# Patient Record
Sex: MALE | Race: WHITE | NOT HISPANIC OR LATINO | Employment: UNEMPLOYED | URBAN - METROPOLITAN AREA
[De-identification: names, ages, dates, MRNs, and addresses within clinical notes are randomized per-mention and may not be internally consistent; named-entity substitution may affect disease eponyms.]

---

## 2024-02-21 ENCOUNTER — HOSPITAL ENCOUNTER (OUTPATIENT)
Dept: RADIOLOGY | Facility: HOSPITAL | Age: 34
Discharge: HOME | End: 2024-02-21

## 2024-02-21 ENCOUNTER — APPOINTMENT (OUTPATIENT)
Dept: RADIOLOGY | Facility: CLINIC | Age: 34
End: 2024-02-21

## 2024-02-21 DIAGNOSIS — L89.610 UNSTAGEABLE PRESSURE ULCER OF RIGHT HEEL (HCC): ICD-10-CM

## 2024-02-21 PROCEDURE — 73650 X-RAY EXAM OF HEEL: CPT

## 2024-02-22 DIAGNOSIS — M86.00 ACUTE HEMATOGENOUS OSTEOMYELITIS, UNSPECIFIED SITE (HCC): Primary | ICD-10-CM

## 2024-02-23 ENCOUNTER — APPOINTMENT (EMERGENCY)
Dept: CT IMAGING | Facility: HOSPITAL | Age: 34
DRG: 720 | End: 2024-02-23
Payer: COMMERCIAL

## 2024-02-23 ENCOUNTER — APPOINTMENT (EMERGENCY)
Dept: RADIOLOGY | Facility: HOSPITAL | Age: 34
DRG: 720 | End: 2024-02-23
Payer: COMMERCIAL

## 2024-02-23 ENCOUNTER — HOSPITAL ENCOUNTER (INPATIENT)
Facility: HOSPITAL | Age: 34
LOS: 8 days | Discharge: HOME | DRG: 720 | End: 2024-03-02
Attending: EMERGENCY MEDICINE | Admitting: STUDENT IN AN ORGANIZED HEALTH CARE EDUCATION/TRAINING PROGRAM
Payer: COMMERCIAL

## 2024-02-23 ENCOUNTER — APPOINTMENT (INPATIENT)
Dept: MRI IMAGING | Facility: HOSPITAL | Age: 34
DRG: 720 | End: 2024-02-23
Payer: COMMERCIAL

## 2024-02-23 DIAGNOSIS — A41.9 SEPSIS WITHOUT ACUTE ORGAN DYSFUNCTION, DUE TO UNSPECIFIED ORGANISM (HCC): ICD-10-CM

## 2024-02-23 DIAGNOSIS — M79.671 RIGHT FOOT PAIN: Primary | ICD-10-CM

## 2024-02-23 DIAGNOSIS — M86.9 OSTEOMYELITIS OF RIGHT FOOT (HCC): ICD-10-CM

## 2024-02-23 DIAGNOSIS — F11.93 OPIATE WITHDRAWAL (HCC): ICD-10-CM

## 2024-02-23 PROBLEM — S92.009A CALCANEAL FRACTURE: Status: ACTIVE | Noted: 2024-02-23

## 2024-02-23 PROBLEM — F11.90 OPIOID USE DISORDER: Status: ACTIVE | Noted: 2024-02-23

## 2024-02-23 PROBLEM — S92.001A FRACTURE OF RIGHT CALCANEUS: Status: ACTIVE | Noted: 2024-02-23

## 2024-02-23 PROBLEM — R82.90 ABNORMAL URINALYSIS: Status: ACTIVE | Noted: 2024-02-23

## 2024-02-23 LAB
ALBUMIN SERPL BCP-MCNC: 4.8 G/DL (ref 3.5–5)
ALP SERPL-CCNC: 117 U/L (ref 34–104)
ALT SERPL W P-5'-P-CCNC: 26 U/L (ref 7–52)
AMORPH URATE CRY URNS QL MICRO: ABNORMAL
AMPHETAMINES SERPL QL SCN: NEGATIVE
ANION GAP SERPL CALCULATED.3IONS-SCNC: 12 MMOL/L
AST SERPL W P-5'-P-CCNC: 14 U/L (ref 13–39)
BACTERIA UR QL AUTO: ABNORMAL /HPF
BARBITURATES UR QL: NEGATIVE
BASOPHILS # BLD AUTO: 0.04 THOUSANDS/ÂΜL (ref 0–0.1)
BASOPHILS NFR BLD AUTO: 0 % (ref 0–1)
BENZODIAZ UR QL: POSITIVE
BILIRUB SERPL-MCNC: 0.48 MG/DL (ref 0.2–1)
BILIRUB UR QL STRIP: NEGATIVE
BUN SERPL-MCNC: 15 MG/DL (ref 5–25)
CALCIUM SERPL-MCNC: 10.4 MG/DL (ref 8.4–10.2)
CHLORIDE SERPL-SCNC: 102 MMOL/L (ref 96–108)
CLARITY UR: CLEAR
CO2 SERPL-SCNC: 27 MMOL/L (ref 21–32)
COCAINE UR QL: NEGATIVE
COLOR UR: YELLOW
CREAT SERPL-MCNC: 0.81 MG/DL (ref 0.6–1.3)
CRP SERPL QL: 10.9 MG/L
EOSINOPHIL # BLD AUTO: 0.01 THOUSAND/ÂΜL (ref 0–0.61)
EOSINOPHIL NFR BLD AUTO: 0 % (ref 0–6)
ERYTHROCYTE [DISTWIDTH] IN BLOOD BY AUTOMATED COUNT: 12.9 % (ref 11.6–15.1)
ERYTHROCYTE [SEDIMENTATION RATE] IN BLOOD: 69 MM/HOUR (ref 0–14)
GFR SERPL CREATININE-BSD FRML MDRD: 118 ML/MIN/1.73SQ M
GLUCOSE SERPL-MCNC: 114 MG/DL (ref 65–140)
GLUCOSE UR STRIP-MCNC: ABNORMAL MG/DL
HCT VFR BLD AUTO: 46.7 % (ref 36.5–49.3)
HGB BLD-MCNC: 14.9 G/DL (ref 12–17)
HGB UR QL STRIP.AUTO: NEGATIVE
IMM GRANULOCYTES # BLD AUTO: 0.19 THOUSAND/UL (ref 0–0.2)
IMM GRANULOCYTES NFR BLD AUTO: 1 % (ref 0–2)
KETONES UR STRIP-MCNC: ABNORMAL MG/DL
LACTATE SERPL-SCNC: 0.9 MMOL/L (ref 0.5–2)
LACTATE SERPL-SCNC: 2.1 MMOL/L (ref 0.5–2)
LEUKOCYTE ESTERASE UR QL STRIP: NEGATIVE
LYMPHOCYTES # BLD AUTO: 2.38 THOUSANDS/ÂΜL (ref 0.6–4.47)
LYMPHOCYTES NFR BLD AUTO: 14 % (ref 14–44)
MCH RBC QN AUTO: 27.9 PG (ref 26.8–34.3)
MCHC RBC AUTO-ENTMCNC: 31.9 G/DL (ref 31.4–37.4)
MCV RBC AUTO: 88 FL (ref 82–98)
METHADONE UR QL: NEGATIVE
MONOCYTES # BLD AUTO: 1.4 THOUSAND/ÂΜL (ref 0.17–1.22)
MONOCYTES NFR BLD AUTO: 8 % (ref 4–12)
NEUTROPHILS # BLD AUTO: 13.56 THOUSANDS/ÂΜL (ref 1.85–7.62)
NEUTS SEG NFR BLD AUTO: 77 % (ref 43–75)
NITRITE UR QL STRIP: NEGATIVE
NON-SQ EPI CELLS URNS QL MICRO: ABNORMAL /HPF
NRBC BLD AUTO-RTO: 0 /100 WBCS
OPIATES UR QL SCN: NEGATIVE
OXYCODONE+OXYMORPHONE UR QL SCN: NEGATIVE
PCP UR QL: NEGATIVE
PH UR STRIP.AUTO: 6 [PH]
PLATELET # BLD AUTO: 593 THOUSANDS/UL (ref 149–390)
PMV BLD AUTO: 9.3 FL (ref 8.9–12.7)
POTASSIUM SERPL-SCNC: 3.6 MMOL/L (ref 3.5–5.3)
PROT SERPL-MCNC: 9.1 G/DL (ref 6.4–8.4)
PROT UR STRIP-MCNC: ABNORMAL MG/DL
RBC # BLD AUTO: 5.34 MILLION/UL (ref 3.88–5.62)
RBC #/AREA URNS AUTO: ABNORMAL /HPF
SODIUM SERPL-SCNC: 141 MMOL/L (ref 135–147)
SP GR UR STRIP.AUTO: >=1.03 (ref 1–1.03)
THC UR QL: NEGATIVE
UROBILINOGEN UR STRIP-ACNC: <2 MG/DL
WBC # BLD AUTO: 17.58 THOUSAND/UL (ref 4.31–10.16)
WBC #/AREA URNS AUTO: ABNORMAL /HPF

## 2024-02-23 PROCEDURE — 99285 EMERGENCY DEPT VISIT HI MDM: CPT

## 2024-02-23 PROCEDURE — 99448 NTRPROF PH1/NTRNET/EHR 21-30: CPT | Performed by: EMERGENCY MEDICINE

## 2024-02-23 PROCEDURE — 96374 THER/PROPH/DIAG INJ IV PUSH: CPT

## 2024-02-23 PROCEDURE — 99254 IP/OBS CNSLTJ NEW/EST MOD 60: CPT | Performed by: PODIATRIST

## 2024-02-23 PROCEDURE — 85652 RBC SED RATE AUTOMATED: CPT | Performed by: PHYSICIAN ASSISTANT

## 2024-02-23 PROCEDURE — A9585 GADOBUTROL INJECTION: HCPCS | Performed by: STUDENT IN AN ORGANIZED HEALTH CARE EDUCATION/TRAINING PROGRAM

## 2024-02-23 PROCEDURE — 83605 ASSAY OF LACTIC ACID: CPT | Performed by: PHYSICIAN ASSISTANT

## 2024-02-23 PROCEDURE — 87086 URINE CULTURE/COLONY COUNT: CPT | Performed by: STUDENT IN AN ORGANIZED HEALTH CARE EDUCATION/TRAINING PROGRAM

## 2024-02-23 PROCEDURE — 73723 MRI JOINT LWR EXTR W/O&W/DYE: CPT

## 2024-02-23 PROCEDURE — 99285 EMERGENCY DEPT VISIT HI MDM: CPT | Performed by: PHYSICIAN ASSISTANT

## 2024-02-23 PROCEDURE — 81001 URINALYSIS AUTO W/SCOPE: CPT | Performed by: PHYSICIAN ASSISTANT

## 2024-02-23 PROCEDURE — 99223 1ST HOSP IP/OBS HIGH 75: CPT | Performed by: STUDENT IN AN ORGANIZED HEALTH CARE EDUCATION/TRAINING PROGRAM

## 2024-02-23 PROCEDURE — 80053 COMPREHEN METABOLIC PANEL: CPT | Performed by: PHYSICIAN ASSISTANT

## 2024-02-23 PROCEDURE — 87040 BLOOD CULTURE FOR BACTERIA: CPT | Performed by: PHYSICIAN ASSISTANT

## 2024-02-23 PROCEDURE — 71045 X-RAY EXAM CHEST 1 VIEW: CPT

## 2024-02-23 PROCEDURE — 83605 ASSAY OF LACTIC ACID: CPT | Performed by: STUDENT IN AN ORGANIZED HEALTH CARE EDUCATION/TRAINING PROGRAM

## 2024-02-23 PROCEDURE — 73700 CT LOWER EXTREMITY W/O DYE: CPT

## 2024-02-23 PROCEDURE — 85025 COMPLETE CBC W/AUTO DIFF WBC: CPT | Performed by: PHYSICIAN ASSISTANT

## 2024-02-23 PROCEDURE — 86140 C-REACTIVE PROTEIN: CPT | Performed by: PHYSICIAN ASSISTANT

## 2024-02-23 PROCEDURE — 96361 HYDRATE IV INFUSION ADD-ON: CPT

## 2024-02-23 PROCEDURE — 80307 DRUG TEST PRSMV CHEM ANLYZR: CPT | Performed by: STUDENT IN AN ORGANIZED HEALTH CARE EDUCATION/TRAINING PROGRAM

## 2024-02-23 PROCEDURE — 36415 COLL VENOUS BLD VENIPUNCTURE: CPT | Performed by: PHYSICIAN ASSISTANT

## 2024-02-23 RX ORDER — ONDANSETRON 2 MG/ML
4 INJECTION INTRAMUSCULAR; INTRAVENOUS EVERY 4 HOURS PRN
Status: DISCONTINUED | OUTPATIENT
Start: 2024-02-23 | End: 2024-02-25

## 2024-02-23 RX ORDER — ONDANSETRON 2 MG/ML
4 INJECTION INTRAMUSCULAR; INTRAVENOUS ONCE
Status: COMPLETED | OUTPATIENT
Start: 2024-02-23 | End: 2024-02-23

## 2024-02-23 RX ORDER — CEFEPIME HYDROCHLORIDE 2 G/50ML
2000 INJECTION, SOLUTION INTRAVENOUS EVERY 12 HOURS
Status: DISCONTINUED | OUTPATIENT
Start: 2024-02-24 | End: 2024-02-24

## 2024-02-23 RX ORDER — CEFEPIME HYDROCHLORIDE 2 G/50ML
2000 INJECTION, SOLUTION INTRAVENOUS ONCE
Status: COMPLETED | OUTPATIENT
Start: 2024-02-23 | End: 2024-02-23

## 2024-02-23 RX ORDER — POLYETHYLENE GLYCOL 3350 17 G/17G
17 POWDER, FOR SOLUTION ORAL DAILY PRN
Status: DISCONTINUED | OUTPATIENT
Start: 2024-02-23 | End: 2024-03-02 | Stop reason: HOSPADM

## 2024-02-23 RX ORDER — BUPRENORPHINE AND NALOXONE 8; 2 MG/1; MG/1
8 FILM, SOLUBLE BUCCAL; SUBLINGUAL 2 TIMES DAILY
Status: DISCONTINUED | OUTPATIENT
Start: 2024-02-23 | End: 2024-03-02 | Stop reason: HOSPADM

## 2024-02-23 RX ORDER — SODIUM CHLORIDE, SODIUM GLUCONATE, SODIUM ACETATE, POTASSIUM CHLORIDE, MAGNESIUM CHLORIDE, SODIUM PHOSPHATE, DIBASIC, AND POTASSIUM PHOSPHATE .53; .5; .37; .037; .03; .012; .00082 G/100ML; G/100ML; G/100ML; G/100ML; G/100ML; G/100ML; G/100ML
75 INJECTION, SOLUTION INTRAVENOUS CONTINUOUS
Status: DISCONTINUED | OUTPATIENT
Start: 2024-02-23 | End: 2024-02-26

## 2024-02-23 RX ORDER — GADOBUTROL 604.72 MG/ML
8 INJECTION INTRAVENOUS
Status: COMPLETED | OUTPATIENT
Start: 2024-02-23 | End: 2024-02-23

## 2024-02-23 RX ORDER — KETOROLAC TROMETHAMINE 30 MG/ML
15 INJECTION, SOLUTION INTRAMUSCULAR; INTRAVENOUS EVERY 6 HOURS PRN
Status: DISPENSED | OUTPATIENT
Start: 2024-02-23 | End: 2024-02-27

## 2024-02-23 RX ORDER — MAGNESIUM HYDROXIDE/ALUMINUM HYDROXICE/SIMETHICONE 120; 1200; 1200 MG/30ML; MG/30ML; MG/30ML
30 SUSPENSION ORAL EVERY 6 HOURS PRN
Status: DISCONTINUED | OUTPATIENT
Start: 2024-02-23 | End: 2024-03-02 | Stop reason: HOSPADM

## 2024-02-23 RX ORDER — ACETAMINOPHEN 325 MG/1
650 TABLET ORAL EVERY 6 HOURS PRN
Status: DISCONTINUED | OUTPATIENT
Start: 2024-02-23 | End: 2024-02-24

## 2024-02-23 RX ADMIN — ACETAMINOPHEN 650 MG: 325 TABLET ORAL at 20:07

## 2024-02-23 RX ADMIN — ONDANSETRON 4 MG: 2 INJECTION INTRAMUSCULAR; INTRAVENOUS at 14:18

## 2024-02-23 RX ADMIN — GADOBUTROL 8 ML: 604.72 INJECTION INTRAVENOUS at 22:50

## 2024-02-23 RX ADMIN — VANCOMYCIN HYDROCHLORIDE 2000 MG: 5 INJECTION, POWDER, LYOPHILIZED, FOR SOLUTION INTRAVENOUS at 20:14

## 2024-02-23 RX ADMIN — SODIUM CHLORIDE 1000 ML: 0.9 INJECTION, SOLUTION INTRAVENOUS at 14:35

## 2024-02-23 RX ADMIN — BUPRENORPHINE AND NALOXONE 8 MG: 8; 2 FILM BUCCAL; SUBLINGUAL at 18:18

## 2024-02-23 RX ADMIN — CEFEPIME HYDROCHLORIDE 2000 MG: 2 INJECTION, SOLUTION INTRAVENOUS at 16:25

## 2024-02-23 NOTE — ED PROVIDER NOTES
"History  Chief Complaint   Patient presents with    Wound Check     Pt reports fracturing his right heel and then got an infection in the bone. Started oral abx today. Came to er to \"get it checked out\". Reports he did not get it checked before being placed on abx, and the abx came from [facility] rehab.      Eh is a 32 y/o M with PMH of HTN and opioid abuse currently detoxing presenting to the ER with the c/o right heel osteomyelitis. Pt fractured the heel 1/2/24 after jumping over a fence and is currently in a walking boot. He went to [email or web address] yesterday for increased redness and pain in the right foot. He also reports he had complete loss of sensation in the foot. It was xrayed and showed calceneus suspicious for osteomyelitis. He was instructed to get an MRI yesterday, but did not get it. He currently states the pain is a 5/10 and he was able to walk in the ER today, however this morning it was extremely painful to walk on it. He also had severe chills this morning. He just got to [facility] 3 days ago so is 3 days into detox. Also c/o tremors, SOB, N/V (last episode of emesis last night), increased erythema and swelling of the right foot.       History provided by:  Patient  Wound Check         None       Past Medical History:   Diagnosis Date    Hypertension        History reviewed. No pertinent surgical history.    History reviewed. No pertinent family history.  I have reviewed and agree with the history as documented.    E-Cigarette/Vaping     E-Cigarette/Vaping Substances     Social History     Tobacco Use    Smoking status: Never    Smokeless tobacco: Never   Substance Use Topics    Alcohol use: Never    Drug use: Not Currently     Types: Fentanyl, Heroin       Review of Systems   Constitutional:  Positive for chills. Negative for fever.   HENT:  Negative for ear pain and sore throat.    Eyes:  Negative for pain and visual disturbance.   Respiratory:  Positive for shortness of breath. Negative " for cough.    Cardiovascular:  Negative for chest pain and palpitations.   Gastrointestinal:  Positive for nausea and vomiting. Negative for abdominal pain.   Genitourinary:  Negative for dysuria and hematuria.   Musculoskeletal:  Positive for arthralgias (right foot) and joint swelling. Negative for back pain.   Skin:  Negative for color change and rash.   Neurological:  Positive for tremors. Negative for seizures, syncope and headaches.   All other systems reviewed and are negative.      Physical Exam  Physical Exam  Vitals and nursing note reviewed.   Constitutional:       General: He is not in acute distress.     Appearance: He is well-developed. He is not ill-appearing.   HENT:      Head: Normocephalic and atraumatic.      Jaw: There is normal jaw occlusion.      Nose: Nose normal.      Mouth/Throat:      Mouth: Mucous membranes are moist.   Eyes:      Conjunctiva/sclera: Conjunctivae normal.   Cardiovascular:      Rate and Rhythm: Normal rate and regular rhythm.      Pulses:           Radial pulses are 2+ on the right side and 2+ on the left side.        Dorsalis pedis pulses are 2+ on the right side and 2+ on the left side.      Heart sounds: No murmur heard.  Pulmonary:      Effort: Pulmonary effort is normal. No respiratory distress.      Breath sounds: Normal breath sounds and air entry.   Abdominal:      General: Abdomen is flat. Bowel sounds are normal.      Palpations: Abdomen is soft.      Tenderness: There is no abdominal tenderness.   Musculoskeletal:         General: No swelling.      Cervical back: Neck supple.      Right foot: Normal range of motion. Swelling and tenderness present.      Left foot: Normal.      Comments: Increased erythema and edema of R foot when compared to L foot. No tenderness to palpation. No open wounds   Skin:     General: Skin is warm and dry.      Capillary Refill: Capillary refill takes less than 2 seconds.      Coloration: Skin is not jaundiced or pale.   Neurological:       Mental Status: He is alert and oriented to person, place, and time.      GCS: GCS eye subscore is 4. GCS verbal subscore is 5. GCS motor subscore is 6.   Psychiatric:         Mood and Affect: Mood normal. Affect is flat.         Behavior: Behavior is cooperative.         Vital Signs  ED Triage Vitals   Temperature Pulse Respirations Blood Pressure SpO2   02/23/24 1154 02/23/24 1154 02/23/24 1152 02/23/24 1154 02/23/24 1154   98.8 °F (37.1 °C) 86 18 139/96 97 %      Temp Source Heart Rate Source Patient Position - Orthostatic VS BP Location FiO2 (%)   02/23/24 1152 02/23/24 1152 02/23/24 1152 02/23/24 1152 --   Temporal Monitor Sitting Right arm       Pain Score       --                  Vitals:    02/23/24 1330 02/23/24 1400 02/23/24 1430 02/23/24 1615   BP: 134/90 136/80 137/74 134/79   Pulse: 66 68 71 80   Patient Position - Orthostatic VS:             Visual Acuity      ED Medications  Medications   cefepime (MAXIPIME) IVPB (premix in dextrose) 2,000 mg 50 mL (2,000 mg Intravenous New Bag 2/23/24 1625)   vancomycin (VANCOCIN) 2000 mg in sodium chloride 0.9% 500 mL IVPB (has no administration in time range)   ondansetron (ZOFRAN) injection 4 mg (4 mg Intravenous Given 2/23/24 1418)   sodium chloride 0.9 % bolus 1,000 mL (0 mL Intravenous Stopped 2/23/24 1601)       Diagnostic Studies  Results Reviewed       Procedure Component Value Units Date/Time    Urine Microscopic [[id number]]  (Abnormal) Collected: 02/23/24 1432    Lab Status: Final result Specimen: Urine, Clean Catch Updated: 02/23/24 1620     RBC, UA 0-1 /hpf      WBC, UA 0-1 /hpf      Epithelial Cells Occasional /hpf      Bacteria, UA Innumerable /hpf      Amorphous Crystals, UA Innumerable    Narrative:      Microscopic performed by Livia Echavarria.     UA w Reflex to Microscopic w Reflex to Culture [[id number]]  (Abnormal) Collected: 02/23/24 1432    Lab Status: Final result Specimen: Urine, Clean Catch Updated: 02/23/24 1445     Color, UA Yellow      Clarity, UA Clear     Specific Gravity, UA >=1.030     pH, UA 6.0     Leukocytes, UA Negative     Nitrite, UA Negative     Protein, UA 30 (1+) mg/dl      Glucose, UA 30 (3/100%) mg/dl      Ketones, UA 10 (1+) mg/dl      Urobilinogen, UA <2.0 mg/dl      Bilirubin, UA Negative     Occult Blood, UA Negative    Lactic acid, plasma (w/reflex if result > 2.0) [[id number]]  (Abnormal) Collected: 02/23/24 1336    Lab Status: Final result Specimen: Blood from Arm, Left Updated: 02/23/24 1420     LACTIC ACID 2.1 mmol/L     Narrative:      Result may be elevated if tourniquet was used during collection.    Lactic acid 2 Hours [[id number]]     Lab Status: No result Specimen: Blood     Comprehensive metabolic panel [[id number]]  (Abnormal) Collected: 02/23/24 1322    Lab Status: Final result Specimen: Blood from Arm, Left Updated: 02/23/24 1359     Sodium 141 mmol/L      Potassium 3.6 mmol/L      Chloride 102 mmol/L      CO2 27 mmol/L      ANION GAP 12 mmol/L      BUN 15 mg/dL      Creatinine 0.81 mg/dL      Glucose 114 mg/dL      Calcium 10.4 mg/dL      AST 14 U/L      ALT 26 U/L      Alkaline Phosphatase 117 U/L      Total Protein 9.1 g/dL      Albumin 4.8 g/dL      Total Bilirubin 0.48 mg/dL      eGFR 118 ml/min/1.73sq m     Narrative:      National Kidney Disease Foundation guidelines for Chronic Kidney Disease (CKD):     Stage 1 with normal or high GFR (GFR > 90 mL/min/1.73 square meters)    Stage 2 Mild CKD (GFR = 60-89 mL/min/1.73 square meters)    Stage 3A Moderate CKD (GFR = 45-59 mL/min/1.73 square meters)    Stage 3B Moderate CKD (GFR = 30-44 mL/min/1.73 square meters)    Stage 4 Severe CKD (GFR = 15-29 mL/min/1.73 square meters)    Stage 5 End Stage CKD (GFR <15 mL/min/1.73 square meters)  Note: GFR calculation is accurate only with a steady state creatinine    C-reactive protein [[id number]]  (Abnormal) Collected: 02/23/24 1322    Lab Status: Final result Specimen: Blood from Arm, Left Updated: 02/23/24 2759     CRP  10.9 mg/L     Blood culture #2 [[id number]] Collected: 02/23/24 1336    Lab Status: In process Specimen: Blood from Arm, Right Updated: 02/23/24 1342    Blood culture #1 [[id number]] Collected: 02/23/24 1336    Lab Status: In process Specimen: Blood from Arm, Left Updated: 02/23/24 1342    Sedimentation rate, automated [[id number]]  (Abnormal) Collected: 02/23/24 1322    Lab Status: Final result Specimen: Blood from Arm, Left Updated: 02/23/24 1339     Sed Rate 69 mm/hour     CBC and differential [[id number]]  (Abnormal) Collected: 02/23/24 1322    Lab Status: Final result Specimen: Blood from Arm, Left Updated: 02/23/24 1331     WBC 17.58 Thousand/uL      RBC 5.34 Million/uL      Hemoglobin 14.9 g/dL      Hematocrit 46.7 %      MCV 88 fL      MCH 27.9 pg      MCHC 31.9 g/dL      RDW 12.9 %      MPV 9.3 fL      Platelets 593 Thousands/uL      nRBC 0 /100 WBCs      Neutrophils Relative 77 %      Immat GRANS % 1 %      Lymphocytes Relative 14 %      Monocytes Relative 8 %      Eosinophils Relative 0 %      Basophils Relative 0 %      Neutrophils Absolute 13.56 Thousands/µL      Immature Grans Absolute 0.19 Thousand/uL      Lymphocytes Absolute 2.38 Thousands/µL      Monocytes Absolute 1.40 Thousand/µL      Eosinophils Absolute 0.01 Thousand/µL      Basophils Absolute 0.04 Thousands/µL                    CT lower extremity wo contrast right   Final Result by Cristian Delgado MD (02/23 1507)      Extensive comminuted calcaneus fracture as above. Mild diffuse osteopenia likely related to disuse.         Workstation performed: [id number]         XR chest 1 view portable   ED Interpretation by Melissa Jett PA-C (02/23 1446)   No acute abnormalities.       Final Result by Torsten Ambriz MD (02/23 1626)      No acute cardiopulmonary disease.            Workstation performed: [id number]         MRI ED order    (Results Pending)              Procedures  Procedures         ED Course  ED Course as of 02/23/24 1651    Fri Feb 23, 2024   1351 Spoke with Dr. Camejo via TT, she would like CT scan to further evaluate foot pain.    1510 Podiatry aware of CT results.   1533 Dr. Camejo suggested contacting ortho concerning CT results.    1541 Discussed case with Dr. Chacon via TT, he states this is most likely Charcot arthropathy and f/u with podiatry as outpatient.    1549 Dr. Camejo said CT concerning for infection, will admit on IV abx.    1552 SLim paged for admission.    1650 Discussed withdrawal with Dr. Carpenter, he suggests to continue suboxone 8mg BID, next dose tonight. Dr. Manzano aware.                             Initial Sepsis Screening       Row Name 02/23/24 1424                Is the patient's history suggestive of a new or worsening infection? Yes (Proceed)  -CD        Suspected source of infection suspect infection, source unknown  -CD        Indicate SIRS criteria Leukocytosis (WBC > 87770 IJL) OR Leukopenia (WBC <4000 IJL) OR Bandemia (WBC >10% bands)  -CD        Are two or more of the above signs & symptoms of infection both present and new to the patient? No  -CD                  User Key  (r) = Recorded By, (t) = Taken By, (c) = Cosigned By      Initials Name Provider Type    CD Melissa Jett PA-C Physician Assistant                    SBIRT 22yo+      Flowsheet Row Most Recent Value   Initial Alcohol Screen: US AUDIT-C     1. How often do you have a drink containing alcohol? 0 Filed at: 02/23/2024 1153   2. How many drinks containing alcohol do you have on a typical day you are drinking?  0 Filed at: 02/23/2024 1153   3a. Male UNDER 65: How often do you have five or more drinks on one occasion? 0 Filed at: 02/23/2024 1153   3b. FEMALE Any Age, or MALE 65+: How often do you have 4 or more drinks on one occassion? 0 Filed at: 02/23/2024 1153   Audit-C Score 0 Filed at: 02/23/2024 1153   ELADIO: How many times in the past year have you...    Used an illegal drug or used a prescription medication for  non-medical reasons? Never Filed at: 02/23/2024 1153                      Medical Decision Making  Ddx: osteomyelitis of R heel, cellulitis     Check CBC, CMP, ESR, CRP. Consult podiatry. Start broad spectrum abx- Vanco and Cefepime. Consult podiatry, get CT, and plan to admit for IV abx.     Amount and/or Complexity of Data Reviewed  External Data Reviewed: radiology and notes.  Labs: ordered.  Radiology: ordered and independent interpretation performed.  Discussion of management or test interpretation with external provider(s): D/w podiatry and ortho.     Risk  Prescription drug management.  Decision regarding hospitalization.             Disposition  Final diagnoses:   Right foot pain   Osteomyelitis of right foot (HCC)   Opiate withdrawal (HCC)     Time reflects when diagnosis was documented in both MDM as applicable and the Disposition within this note       Time User Action Codes Description Comment    2/23/2024  2:05 PM Melissa Jett [M79.671] Right foot pain     2/23/2024  3:57 PM Melissa Jett [M86.9] Osteomyelitis of right foot (HCC)     2/23/2024  4:42 PM Melissa Jett [F11.93] Opiate withdrawal (HCC)           ED Disposition       ED Disposition   Admit    Condition   Stable    Date/Time   Fri Feb 23, 2024 1556    Comment   Case was discussed with DR. Manzano and the patient's admission status was agreed to be Admission Status: inpatient status to the service of Dr. Manzano .               Follow-up Information    None         Patient's Medications    No medications on file       No discharge procedures on file.    PDMP Review       None            ED Provider  Electronically Signed by             Melissa Jett PA-C  02/23/24 1603       Melissa Jett PA-C  02/23/24 1717

## 2024-02-23 NOTE — CONSULTS
INTERPROFESSIONAL (PHONE) CONSULTATION - Medical Toxicology  Eh Chauhan 33 y.o. male MRN: [id number]  Unit/Bed#: ED 08 Encounter: [id number]      Reason for Consult / Principal Problem: Opioid Withdrawal  Inpatient consult to Toxicology  Consult performed by: Jason Carpenter DO  Consult ordered by: Melissa Jett PA-C        02/23/24      ASSESSMENT:  Opioid Withdrawal  Opioid Use Disorder  Calcaneal osteomyelitis    RECOMMENDATIONS:  Given the timeframe and number of doses of buprenorphine he's already received, there is nothing further necessary from a withdrawal standpoint needed. Simply continue buprenorphine 8mg BID starting this evening.     Med tox will remain available as needed.     For further questions, please contact the medical  on call via [facility] between 8am and 9pm. If between 9pm and 8am, please reach out to the [facility] at [phone].     Please see additional teaching note below:    Hx and PE limited by the dynamics of a phone consultation. I have not personally interviewed or evaluated the patient, but only advised based on the information provided to me. Primary provider is responsible for all clinical decisions.     Pertinent history, physical exam and clinical findings and course discussed: Eh Chauhan is a 33 y.o. year old male who presents with osteomyelitis. He had a calcaneal fracture in January. He was admitted to [facility] 3 days ago for opioid detox. He was sent in from  to the ED for worsening heel pain. He was also in the ED at [place] last night apparently. He is 3 days into his withdrawal management. He received 8mg buprenorphine last night and additional 16mg or so today at . Med tox requested for ongoing recs.     Review of systems and physical exam not performed by me.    Historical Information   Past Medical History:   Diagnosis Date    Hypertension      History reviewed. No pertinent surgical history.  Social History   Social History  "    Substance and Sexual Activity   Alcohol Use Never     Social History     Substance and Sexual Activity   Drug Use Not Currently    Types: Fentanyl, Heroin     Social History     Tobacco Use   Smoking Status Never   Smokeless Tobacco Never     History reviewed. No pertinent family history.     Prior to Admission medications    Not on File       Current Facility-Administered Medications   Medication Dose Route Frequency    vancomycin (VANCOCIN) 2000 mg in sodium chloride 0.9% 500 mL IVPB  25 mg/kg Intravenous Once       No Known Allergies    Objective     No intake or output data in the 24 hours ending 02/23/24 1717    Invasive Devices:   Peripheral IV 02/23/24 Left Antecubital (Active)   Site Assessment WDL 02/23/24 1320   Dressing Type Securing device;Transparent 02/23/24 1320   Line Status Flushed;Blood return noted 02/23/24 1320   Dressing Status Clean;Dry;Intact 02/23/24 1320       Vitals   Vitals:    02/23/24 1330 02/23/24 1400 02/23/24 1430 02/23/24 1615   BP: 134/90 136/80 137/74 134/79   TempSrc:       Pulse: 66 68 71 80   Resp: 18 18 18 18   Patient Position - Orthostatic VS:       Temp:             EKG, Pathology, and/or Other Studies: I have personally reviewed pertinent reports.        Lab Results: I have personally reviewed pertinent reports.      Labs:    Results from last 7 days   Lab Units 02/23/24  1322   WBC Thousand/uL 17.58*   HEMOGLOBIN g/dL 14.9   HEMATOCRIT % 46.7   PLATELETS Thousands/uL 593*   NEUTROS PCT % 77*   LYMPHS PCT % 14   MONOS PCT % 8   EOS PCT % 0      Results from last 7 days   Lab Units 02/23/24  1322   SODIUM mmol/L 141   POTASSIUM mmol/L 3.6   CHLORIDE mmol/L 102   CO2 mmol/L 27   BUN mg/dL 15   CREATININE mg/dL 0.81   CALCIUM mg/dL 10.4*   ALK PHOS U/L 117*   ALT U/L 26   AST U/L 14          Results from last 7 days   Lab Units 02/23/24  1336   LACTIC ACID mmol/L 2.1*     No results found for: \"TROPONINI\"          Invalid input(s): \"EXTPREGUR\"      Imaging Studies: I have " personally reviewed pertinent reports.      Counseling / Coordination of Care  Total time spent today 21 minutes. This was a phone consultation.

## 2024-02-23 NOTE — ASSESSMENT & PLAN NOTE
Comes from [facility] for where he has been undergoing opioid detox  Discussed with toxicology who was consulted and recommended:  Continue suboxone 8 mg bid to start this evening 2/23

## 2024-02-23 NOTE — CONSULTS
[facility] Podiatry - Consultation    Patient Information:   Eh Chauhan 33 y.o. male MRN: [id number]  Unit/Bed#: ED 08 Encounter: [id number]  PCP: No primary care provider on file.  Date of Admission:  2/23/2024  Date of Consultation: 02/23/24  Requesting Physician: Amelia Manzano MD      ASSESSMENT:    Eh Chauhan is a 33 y.o. male with:    Right calcaneal fracture  Leukocytosis  Opioid abuse currently detoxing, hypertension    PLAN:    Initial inpatient hospital consultation and bilateral pedal examination.  I personally viewed patient's medical records, patient's right calcaneal x-rays from 2/21/2024, right lower extremity CT without contrast from 2/23/2024, I reviewed images and radiologist interpretation for both.  Patient with white blood cell count of 17.58, elevated lactic acid, sed rate, CRP, at this time we need to rule out calcaneal infection given suspected lucency noted posterior aspect of calcaneus suspicious for infection versus residual fracture.  Patient has osteopenia on CT scan yet has been weightbearing in cam boot.    Case was extensively discussed with ED provider, MRI with and without contrast was ordered to rule out abscess, osteomyelitis versus osteopenia from fracture versus Charcot foot as suggested by orthopedic surgery.  Patient started on IV antibiotics of cefepime per ED.  At this time no surgical intervention is necessary, we will trend white blood cell count, labs, blood cultures, MRI.  Patient may weight-bear as tolerated in cam boot which she already has.  Thank you for consultation, we will gladly follow along.  Rest of care per primary team.        SUBJECTIVE    History of Present Illness:    Eh Chauhan is a 33 y.o. male with past medical history of hypertension and opiate abuse currently detoxing presented to ED with concern of right heel osteomyelitis per x-ray ordered by PCP, patient found to be diaphoretic, with leukocytosis and admitted for further workup of infection and MRI.  " Patient fracture of the heel and 1/2/2024 after jumping over a fence and is currently in a walking boot.  He states he went to [place] yesterday for increased redness and pain of the right foot, he also had complete loss of sensation in the foot, foot was x-rayed and showed calcaneus suspicious for osteomyelitis, he was instructed to get an MRI but he did not get it yet, currently he states his pain is much better than yesterday.  We have been consulted for care of right heel.    Review of Systems:    Constitutional: Negative.    HENT: Negative.    Eyes: Negative.    Respiratory: Negative.    Cardiovascular: Negative.    Gastrointestinal: Negative.    Musculoskeletal: Right heel pain  Skin: Negative  Neurological: Negative  Psych: Negative.     Past Medical and Surgical History:     Past Medical History:   Diagnosis Date    Hypertension        History reviewed. No pertinent surgical history.    Meds/Allergies:    (Not in a hospital admission)      No Known Allergies    Social History:     Marital Status: Single    Substance Use History:   Social History     Substance and Sexual Activity   Alcohol Use Never     Social History     Tobacco Use   Smoking Status Never   Smokeless Tobacco Never     Social History     Substance and Sexual Activity   Drug Use Not Currently    Types: Fentanyl, Heroin       Family History:    History reviewed. No pertinent family history.      OBJECTIVE:    Vitals:   Blood Pressure: 134/79 (02/23/24 1615)  Pulse: 80 (02/23/24 1615)  Temperature: 98.8 °F (37.1 °C) (02/23/24 1154)  Temp Source: Temporal (02/23/24 1154)  Respirations: 18 (02/23/24 1615)  Height: 5' 11\" (180.3 cm) (02/23/24 1152)  Weight - Scale: 79.4 kg (175 lb) (02/23/24 1152)  SpO2: 100 % (02/23/24 1615)    Physical Exam    General Appearance: Alert, cooperative, no distress.  HEENT: Head normocephalic, atraumatic, without obvious abnormality.  Psychiatric: AAOx3  Lower Extremity:  Vascular:   Pedal pulses are present. CRT " "< 3 seconds at the digits. +0/4 edema noted at bilateral lower extremities. Pedal hair is present. Skin temperature is WNL bilaterally.    Musculoskeletal:  MMT is 4/5 in all muscle compartments bilaterally. ROM at the 1st MPJ and ankle joint are WNL bilaterally with the leg extended. No Pain on palpation of right foot or heel. No gross deformities noted.     Derm:  Right foot is red compared to left, no open wounds are noted, patient is diaphoretic to bilateral lower extremities.  No ecchymosis, fluctuance or crepitus is noted to right foot and ankle.    Neurological:  Gross sensation is intact. Protective sensation is intact. Patient Denies numbness and/or paresthesias.        Additional Data:     Lab Results: I have personally reviewed pertinent labs including:    Results from last 7 days   Lab Units 02/23/24  1322   WBC Thousand/uL 17.58*   HEMOGLOBIN g/dL 14.9   HEMATOCRIT % 46.7   PLATELETS Thousands/uL 593*   NEUTROS PCT % 77*   LYMPHS PCT % 14   MONOS PCT % 8   EOS PCT % 0     Results from last 7 days   Lab Units 02/23/24  1322   POTASSIUM mmol/L 3.6   CHLORIDE mmol/L 102   CO2 mmol/L 27   BUN mg/dL 15   CREATININE mg/dL 0.81   CALCIUM mg/dL 10.4*   ALK PHOS U/L 117*   ALT U/L 26   AST U/L 14           Cultures: I have personally reviewed pertinent cultures including:              Imaging: I have personally reviewed pertinent films in PACS.  Pathology, and Other Studies: I have personally reviewed pertinent reports.        ** Please Note: Portions of the record may have been created with voice recognition software. Occasional wrong word or \"sound a like\" substitutions may have occurred due to the inherent limitations of voice recognition software. Read the chart carefully and recognize, using context, where substitutions have occurred. **   "

## 2024-02-24 LAB
ALBUMIN SERPL BCP-MCNC: 3.7 G/DL (ref 3.5–5)
ALP SERPL-CCNC: 83 U/L (ref 34–104)
ALT SERPL W P-5'-P-CCNC: 18 U/L (ref 7–52)
ANION GAP SERPL CALCULATED.3IONS-SCNC: 9 MMOL/L
AST SERPL W P-5'-P-CCNC: 12 U/L (ref 13–39)
BASOPHILS # BLD AUTO: 0.05 THOUSANDS/ÂΜL (ref 0–0.1)
BASOPHILS NFR BLD AUTO: 0 % (ref 0–1)
BILIRUB SERPL-MCNC: 0.56 MG/DL (ref 0.2–1)
BUN SERPL-MCNC: 16 MG/DL (ref 5–25)
CALCIUM SERPL-MCNC: 9.1 MG/DL (ref 8.4–10.2)
CHLORIDE SERPL-SCNC: 108 MMOL/L (ref 96–108)
CO2 SERPL-SCNC: 23 MMOL/L (ref 21–32)
CREAT SERPL-MCNC: 0.61 MG/DL (ref 0.6–1.3)
EOSINOPHIL # BLD AUTO: 0.03 THOUSAND/ÂΜL (ref 0–0.61)
EOSINOPHIL NFR BLD AUTO: 0 % (ref 0–6)
ERYTHROCYTE [DISTWIDTH] IN BLOOD BY AUTOMATED COUNT: 13.2 % (ref 11.6–15.1)
GFR SERPL CREATININE-BSD FRML MDRD: 133 ML/MIN/1.73SQ M
GLUCOSE SERPL-MCNC: 96 MG/DL (ref 65–140)
HCT VFR BLD AUTO: 39.3 % (ref 36.5–49.3)
HGB BLD-MCNC: 12.1 G/DL (ref 12–17)
IMM GRANULOCYTES # BLD AUTO: 0.13 THOUSAND/UL (ref 0–0.2)
IMM GRANULOCYTES NFR BLD AUTO: 1 % (ref 0–2)
LYMPHOCYTES # BLD AUTO: 2.5 THOUSANDS/ÂΜL (ref 0.6–4.47)
LYMPHOCYTES NFR BLD AUTO: 19 % (ref 14–44)
MAGNESIUM SERPL-MCNC: 2.1 MG/DL (ref 1.9–2.7)
MCH RBC QN AUTO: 27.8 PG (ref 26.8–34.3)
MCHC RBC AUTO-ENTMCNC: 30.8 G/DL (ref 31.4–37.4)
MCV RBC AUTO: 90 FL (ref 82–98)
MONOCYTES # BLD AUTO: 1.1 THOUSAND/ÂΜL (ref 0.17–1.22)
MONOCYTES NFR BLD AUTO: 8 % (ref 4–12)
NEUTROPHILS # BLD AUTO: 9.33 THOUSANDS/ÂΜL (ref 1.85–7.62)
NEUTS SEG NFR BLD AUTO: 72 % (ref 43–75)
NRBC BLD AUTO-RTO: 0 /100 WBCS
PLATELET # BLD AUTO: 307 THOUSANDS/UL (ref 149–390)
PMV BLD AUTO: 10.8 FL (ref 8.9–12.7)
POTASSIUM SERPL-SCNC: 3.8 MMOL/L (ref 3.5–5.3)
PROCALCITONIN SERPL-MCNC: <0.05 NG/ML
PROT SERPL-MCNC: 6.9 G/DL (ref 6.4–8.4)
RBC # BLD AUTO: 4.35 MILLION/UL (ref 3.88–5.62)
SODIUM SERPL-SCNC: 140 MMOL/L (ref 135–147)
WBC # BLD AUTO: 13.14 THOUSAND/UL (ref 4.31–10.16)

## 2024-02-24 PROCEDURE — 99233 SBSQ HOSP IP/OBS HIGH 50: CPT | Performed by: STUDENT IN AN ORGANIZED HEALTH CARE EDUCATION/TRAINING PROGRAM

## 2024-02-24 PROCEDURE — 80053 COMPREHEN METABOLIC PANEL: CPT | Performed by: STUDENT IN AN ORGANIZED HEALTH CARE EDUCATION/TRAINING PROGRAM

## 2024-02-24 PROCEDURE — 84145 PROCALCITONIN (PCT): CPT | Performed by: STUDENT IN AN ORGANIZED HEALTH CARE EDUCATION/TRAINING PROGRAM

## 2024-02-24 PROCEDURE — 83735 ASSAY OF MAGNESIUM: CPT | Performed by: STUDENT IN AN ORGANIZED HEALTH CARE EDUCATION/TRAINING PROGRAM

## 2024-02-24 PROCEDURE — 85025 COMPLETE CBC W/AUTO DIFF WBC: CPT | Performed by: STUDENT IN AN ORGANIZED HEALTH CARE EDUCATION/TRAINING PROGRAM

## 2024-02-24 PROCEDURE — 93005 ELECTROCARDIOGRAM TRACING: CPT

## 2024-02-24 PROCEDURE — 99232 SBSQ HOSP IP/OBS MODERATE 35: CPT | Performed by: PODIATRIST

## 2024-02-24 RX ORDER — CEFEPIME HYDROCHLORIDE 2 G/50ML
2000 INJECTION, SOLUTION INTRAVENOUS EVERY 8 HOURS
Status: DISCONTINUED | OUTPATIENT
Start: 2024-02-24 | End: 2024-02-27

## 2024-02-24 RX ORDER — IBUPROFEN 400 MG/1
400 TABLET ORAL EVERY 6 HOURS PRN
Status: DISCONTINUED | OUTPATIENT
Start: 2024-02-24 | End: 2024-03-02 | Stop reason: HOSPADM

## 2024-02-24 RX ORDER — LANOLIN ALCOHOL/MO/W.PET/CERES
6 CREAM (GRAM) TOPICAL
Status: DISCONTINUED | OUTPATIENT
Start: 2024-02-24 | End: 2024-03-02 | Stop reason: HOSPADM

## 2024-02-24 RX ORDER — ONDANSETRON 2 MG/ML
4 INJECTION INTRAMUSCULAR; INTRAVENOUS ONCE
Status: COMPLETED | OUTPATIENT
Start: 2024-02-24 | End: 2024-02-24

## 2024-02-24 RX ORDER — LORAZEPAM 2 MG/ML
2 INJECTION INTRAMUSCULAR ONCE
Status: COMPLETED | OUTPATIENT
Start: 2024-02-24 | End: 2024-02-24

## 2024-02-24 RX ORDER — DIPHENHYDRAMINE HYDROCHLORIDE 50 MG/ML
25 INJECTION INTRAMUSCULAR; INTRAVENOUS EVERY 6 HOURS PRN
Status: DISCONTINUED | OUTPATIENT
Start: 2024-02-24 | End: 2024-02-25

## 2024-02-24 RX ORDER — ACETAMINOPHEN 325 MG/1
975 TABLET ORAL EVERY 8 HOURS
Status: DISCONTINUED | OUTPATIENT
Start: 2024-02-24 | End: 2024-02-25

## 2024-02-24 RX ORDER — DIAZEPAM 5 MG/1
5 TABLET ORAL EVERY 6 HOURS PRN
Status: DISCONTINUED | OUTPATIENT
Start: 2024-02-24 | End: 2024-02-27

## 2024-02-24 RX ORDER — SODIUM CHLORIDE, SODIUM GLUCONATE, SODIUM ACETATE, POTASSIUM CHLORIDE, MAGNESIUM CHLORIDE, SODIUM PHOSPHATE, DIBASIC, AND POTASSIUM PHOSPHATE .53; .5; .37; .037; .03; .012; .00082 G/100ML; G/100ML; G/100ML; G/100ML; G/100ML; G/100ML; G/100ML
1000 INJECTION, SOLUTION INTRAVENOUS ONCE
Status: COMPLETED | OUTPATIENT
Start: 2024-02-24 | End: 2024-02-24

## 2024-02-24 RX ADMIN — VANCOMYCIN HYDROCHLORIDE 1250 MG: 5 INJECTION, POWDER, LYOPHILIZED, FOR SOLUTION INTRAVENOUS at 20:28

## 2024-02-24 RX ADMIN — DIPHENHYDRAMINE HYDROCHLORIDE 25 MG: 50 INJECTION, SOLUTION INTRAMUSCULAR; INTRAVENOUS at 21:26

## 2024-02-24 RX ADMIN — ACETAMINOPHEN 650 MG: 325 TABLET ORAL at 10:37

## 2024-02-24 RX ADMIN — SODIUM CHLORIDE, SODIUM GLUCONATE, SODIUM ACETATE, POTASSIUM CHLORIDE, MAGNESIUM CHLORIDE, SODIUM PHOSPHATE, DIBASIC, AND POTASSIUM PHOSPHATE 1000 ML: .53; .5; .37; .037; .03; .012; .00082 INJECTION, SOLUTION INTRAVENOUS at 14:39

## 2024-02-24 RX ADMIN — TRIMETHOBENZAMIDE HYDROCHLORIDE 200 MG: 100 INJECTION INTRAMUSCULAR at 23:54

## 2024-02-24 RX ADMIN — ONDANSETRON 4 MG: 2 INJECTION INTRAMUSCULAR; INTRAVENOUS at 02:01

## 2024-02-24 RX ADMIN — SODIUM CHLORIDE, SODIUM GLUCONATE, SODIUM ACETATE, POTASSIUM CHLORIDE, MAGNESIUM CHLORIDE, SODIUM PHOSPHATE, DIBASIC, AND POTASSIUM PHOSPHATE 75 ML/HR: .53; .5; .37; .037; .03; .012; .00082 INJECTION, SOLUTION INTRAVENOUS at 18:07

## 2024-02-24 RX ADMIN — Medication 6 MG: at 00:38

## 2024-02-24 RX ADMIN — ONDANSETRON 4 MG: 2 INJECTION INTRAMUSCULAR; INTRAVENOUS at 20:20

## 2024-02-24 RX ADMIN — ACETAMINOPHEN 975 MG: 325 TABLET, FILM COATED ORAL at 16:17

## 2024-02-24 RX ADMIN — LORAZEPAM 2 MG: 2 INJECTION INTRAMUSCULAR; INTRAVENOUS at 11:55

## 2024-02-24 RX ADMIN — ONDANSETRON 4 MG: 2 INJECTION INTRAMUSCULAR; INTRAVENOUS at 10:37

## 2024-02-24 RX ADMIN — BUPRENORPHINE AND NALOXONE 8 MG: 8; 2 FILM BUCCAL; SUBLINGUAL at 18:03

## 2024-02-24 RX ADMIN — VANCOMYCIN HYDROCHLORIDE 1250 MG: 5 INJECTION, POWDER, LYOPHILIZED, FOR SOLUTION INTRAVENOUS at 08:28

## 2024-02-24 RX ADMIN — CEFEPIME HYDROCHLORIDE 2000 MG: 2 INJECTION, SOLUTION INTRAVENOUS at 03:38

## 2024-02-24 RX ADMIN — BUPRENORPHINE AND NALOXONE 8 MG: 8; 2 FILM BUCCAL; SUBLINGUAL at 08:28

## 2024-02-24 RX ADMIN — DIAZEPAM 5 MG: 5 TABLET ORAL at 22:21

## 2024-02-24 RX ADMIN — CEFEPIME HYDROCHLORIDE 2000 MG: 2 INJECTION, SOLUTION INTRAVENOUS at 21:26

## 2024-02-24 RX ADMIN — Medication 6 MG: at 22:22

## 2024-02-24 RX ADMIN — ONDANSETRON 4 MG: 2 INJECTION INTRAMUSCULAR; INTRAVENOUS at 18:35

## 2024-02-24 RX ADMIN — ONDANSETRON 4 MG: 2 INJECTION INTRAMUSCULAR; INTRAVENOUS at 23:19

## 2024-02-24 RX ADMIN — CEFEPIME HYDROCHLORIDE 2000 MG: 2 INJECTION, SOLUTION INTRAVENOUS at 13:04

## 2024-02-24 NOTE — DISCHARGE INSTR - AVS FIRST PAGE
Right Foot  Maintain nonweightbearing with use of cam boot and crutches  Elevate foot is much as possible.  Follow-up with [facility] podiatry upon discharge, information was placed in discharge instructions.    Follow-up with PCP in 1 week  Follow-up with podiatry as outpatient  Follow-up with [facility] within a week

## 2024-02-24 NOTE — ASSESSMENT & PLAN NOTE
Pt presenting d/t worsening right LE pain and possible infection    Initially presented on 1/3/24 where he was found to have a calcaneal fracture after jumping over a fence near [facility] for which he was to follow-up with Ortho however was unable to follow-up    On 2/21/2024 had further pain and was told that there was a possible infection and placed on antibiotics. Pt failed abx and was told to come to the ED for further evaluation    Pt meeting sepsis criteria d/t tachypnea and leukocytosis of 17.58 with a source being osteomyelitis  Patient was given Vanco and cefepime and 1 L of fluids in the ED  Lactic acid 2.1  ESR 69  CRP 10  Discussed with podiatry for home was consulted and recommended continuation of antibiotics and MRI    Follow-up blood cultures  Continue Vanco and cefepime  Follow-up MRI results  Toradol for severe pain

## 2024-02-24 NOTE — PROGRESS NOTES
[facility]  Progress Note  Name: Eh Chauhan I  MRN: [id number]  Unit/Bed#: -01 I Date of Admission: 2/23/2024   Date of Service: 2/24/2024 I Hospital Day: 1    Assessment/Plan   * Osteomyelitis of right foot (HCC)  Assessment & Plan  Pt presenting d/t worsening right LE pain and possible infection    Initially presented on 1/3/24 where he was found to have a calcaneal fracture after jumping over a fence near [facility] for which he was to follow-up with Ortho however was unable to follow-up    On 2/21/2024 had further pain and was told that there was a possible infection and placed on antibiotics. Pt failed abx and was told to come to the ED for further evaluation    Pt meeting sepsis criteria d/t tachypnea and leukocytosis of 17.58 with a source being osteomyelitis  Patient was given Vanco and cefepime and 1 L of fluids in the ED  Lactic acid 2.1  ESR 69  CRP 10  Discussed with podiatry for home was consulted and recommended continuation of antibiotics and MRI    blood cultures pending  Continue Vanco and cefepime day 1  MRI showing no osteo at this time  Toradol for severe pain    Fracture of right calcaneus  Assessment & Plan  See mgx above    Sepsis without acute organ dysfunction (HCC)  Assessment & Plan  See management above    Abnormal urinalysis  Assessment & Plan  UA showing innumerable bacteria   UC ordered    Opioid use disorder  Assessment & Plan  Comes from [facility] for where he has been undergoing opioid detox  Discussed with toxicology who was consulted and recommended:  Continue suboxone 8 mg bid to started the evening of 2/23                   VTE Pharmacologic Prophylaxis: VTE Score: 2 Low Risk (Score 0-2) - Encourage Ambulation.    Mobility:   Basic Mobility Inpatient Raw Score: 17  JH-HLM Goal: 5: Stand one or more mins  JH-HLM Achieved: 2: Bed activities/Dependent transfer  HLM Goal NOT achieved. Continue with multidisciplinary rounding and encourage  appropriate mobility to improve upon HLM goals.    Patient Centered Rounds: I performed bedside rounds with nursing staff today.   Discussions with Specialists or Other Care Team Provider: podiatry, CM, PT, Ot    Education and Discussions with Family / Patient: Patient declined call to .     Total Time Spent on Date of Encounter in care of patient: 52 mins. This time was spent on one or more of the following: performing physical exam; counseling and coordination of care; obtaining or reviewing history; documenting in the medical record; reviewing/ordering tests, medications or procedures; communicating with other healthcare professionals and discussing with patient's family/caregivers.    Current Length of Stay: 1 day(s)  Current Patient Status: Inpatient   Certification Statement: The patient will continue to require additional inpatient hospital stay due to sepsis  Discharge Plan: Anticipate discharge in >72 hrs to nursing home.    Code Status: Level 1 - Full Code    Subjective:   Eh was seen and examined at bedside.  No acute events overnight.  Discussed plan of care.  All questions and concerns were answered and addressed.  Patient clinically looks significantly improved today.  Has no acute complaints.  States that pain is well-controlled.  Will continue Vanco and cefepime at this time awaiting blood cultures.  Discussed with podiatry need for possible repeat imaging to evaluate for osteo as patient was clearly septic last night.    Objective:     Vitals:   Temp (24hrs), Av.1 °F (36.7 °C), Min:97.2 °F (36.2 °C), Max:98.8 °F (37.1 °C)    Temp:  [97.2 °F (36.2 °C)-98.8 °F (37.1 °C)] 97.2 °F (36.2 °C)  HR:  [66-95] 95  Resp:  [18-22] 21  BP: (134-150)/(74-98) 150/98  SpO2:  [97 %-100 %] 97 %  Body mass index is 24.41 kg/m².     Input and Output Summary (last 24 hours):     Intake/Output Summary (Last 24 hours) at 2024 0715  Last data filed at 2024 0607  Gross per 24 hour   Intake 402.5  ml   Output 375 ml   Net 27.5 ml       Physical Exam:   Physical Exam   Vitals and nursing note reviewed.   Constitutional:       General: He is not in acute distress.     Appearance: He is ill-appearing and diaphoretic.      Comments: shivering   HENT:      Head: Normocephalic and atraumatic.   Cardiovascular:      Rate and Rhythm: Normal rate and regular rhythm.      Pulses: Normal pulses.      Heart sounds: Normal heart sounds.   Pulmonary:      Effort: Pulmonary effort is normal.      Breath sounds: Normal breath sounds.   Abdominal:      General: Abdomen is flat. Bowel sounds are normal.      Palpations: Abdomen is soft.   Musculoskeletal:      Right lower leg: No edema.      Left lower leg: No edema.   Skin:     General: Skin is warm.   Neurological:      General: No focal deficit present.      Mental Status: He is alert and oriented to person, place, and time.      Additional Data:     Labs:  Results from last 7 days   Lab Units 02/24/24  0352   WBC Thousand/uL 13.14*   HEMOGLOBIN g/dL 12.1   HEMATOCRIT % 39.3   PLATELETS Thousands/uL 307   NEUTROS PCT % 72   LYMPHS PCT % 19   MONOS PCT % 8   EOS PCT % 0     Results from last 7 days   Lab Units 02/24/24  0352   SODIUM mmol/L 140   POTASSIUM mmol/L 3.8   CHLORIDE mmol/L 108   CO2 mmol/L 23   BUN mg/dL 16   CREATININE mg/dL 0.61   ANION GAP mmol/L 9   CALCIUM mg/dL 9.1   ALBUMIN g/dL 3.7   TOTAL BILIRUBIN mg/dL 0.56   ALK PHOS U/L 83   ALT U/L 18   AST U/L 12*   GLUCOSE RANDOM mg/dL 96                 Results from last 7 days   Lab Units 02/24/24  0352 02/23/24  1956 02/23/24  1336   LACTIC ACID mmol/L  --  0.9 2.1*   PROCALCITONIN ng/ml <0.05  --   --        Lines/Drains:  Invasive Devices       Peripheral Intravenous Line  Duration             Peripheral IV 02/23/24 Left Antecubital <1 day                      Telemetry:  Telemetry Orders (From admission, onward)               24 Hour Telemetry Monitoring  Continuous x 24 Hours (Telem)        Question:   Reason for 24 Hour Telemetry  Answer:  Drug overdose known to cause cardiac arrhythmias (e.g. - Cocaine, TCA, Amphetamines, Phenothiazines, Digoxin, etc.) Meds known to need cardiac monitoring: Amiodarone, Dopamine, Dobutamine, Phenytoin                                  Imaging: Personally reviewed the following imaging: MRI right foot    Recent Cultures (last 7 days):   Results from last 7 days   Lab Units 02/23/24  1336   BLOOD CULTURE  Received in Microbiology Lab. Culture in Progress.  Received in Microbiology Lab. Culture in Progress.       Last 24 Hours Medication List:   Current Facility-Administered Medications   Medication Dose Route Frequency Provider Last Rate    acetaminophen  650 mg Oral Q6H PRN Amelia Manzano MD      aluminum-magnesium hydroxide-simethicone  30 mL Oral Q6H PRN Amelia Manzano MD      buprenorphine-naloxone  8 mg Sublingual BID Amelia Manzano MD      cefepime  2,000 mg Intravenous Q8H Amelia Manzano MD      ketorolac  15 mg Intravenous Q6H PRN Amelia Manzano MD      melatonin  6 mg Oral HS Nery Wayne PA-C      multi-electrolyte  75 mL/hr Intravenous Continuous Amelia Manzano MD 75 mL/hr (02/24/24 0045)    ondansetron  4 mg Intravenous Q4H PRN Amelia Manzano MD      polyethylene glycol  17 g Oral Daily PRN Amelia Manzano MD      sodium chloride  1,000 mL Intravenous Once Amelia Manzano MD 1,000 mL/hr at 02/23/24 2354    vancomycin  15 mg/kg Intravenous Q12H Amelia Manzano MD          Today, Patient Was Seen By: Amelia Manzano MD    **Please Note: This note may have been constructed using a voice recognition system.**

## 2024-02-24 NOTE — ASSESSMENT & PLAN NOTE
Comes from [facility] for where he has been undergoing opioid detox  Discussed with toxicology who was consulted and recommended:  Continue suboxone 8 mg bid to started the evening of 2/23

## 2024-02-24 NOTE — PROGRESS NOTES
Eh Chauhan is a 33 y.o. male who is currently ordered Vancomycin IV with management by the Pharmacy Consult service.  Relevant clinical data and objective / subjective history reviewed.  Vancomycin Assessment:  Indication and Goal AUC/Trough: Bone/joint infection (goal -600, trough >10), -600, trough >10  Clinical Status: worsening  Micro:     Renal Function:  SCr: 0.81 mg/dL  CrCl: 139.9 mL/min  Renal replacement: Not on dialysis  Days of Therapy: 1  Current Dose: 2000mg IV loading dose once  Vancomycin Plan:  New Dosinmg IV Q12H  Estimated AUC: 469 mcg*hr/mL  Estimated Trough: 13.5 mcg/mL  Next Level: With morning labs at approximately 0600 on 24  Renal Function Monitoring: Daily BMP and UOP  Pharmacy will continue to follow closely for s/sx of nephrotoxicity, infusion reactions and appropriateness of therapy.  BMP and CBC will be ordered per protocol. We will continue to follow the patient’s culture results and clinical progress daily.    Nanda Corbett, Pharmacist

## 2024-02-24 NOTE — ASSESSMENT & PLAN NOTE
"Pt presenting d/t worsening right LE pain and possible infection    Initially presented on 1/3/24 where he was found to have a calcaneal fracture after jumping over a fence near [facility] for which he was to follow-up with Ortho however was unable to follow-up    On 2/21/2024 had further pain and was told that there was a possible infection and placed on antibiotics. Pt failed abx and was told to come to the ED for further evaluation    Pt meeting sepsis criteria d/t tachypnea and leukocytosis of 17.58 with a source being osteomyelitis  Patient was given Vanco and cefepime and 1 L of fluids in the ED  Lactic acid 2.1  ESR 69  CRP 10  Discussed with podiatry for home was consulted and recommended continuation of antibiotics and MRI    blood cultures NGTD   Continue cefepime   added azithromycin  Dc'd vanc d/t possible allergic reaction-> continue to montior off of vanc   MRSA culture pending  MRI showing no osteo at this time  CT c/a/pelvis-- Scattered small groundglass opacities in the right lower greater than upper lobes, suggesting atypical infection.2. Mild circumferential urinary bladder wall thickening. Correlate with urinalysis.3. Skin thickening/edema at the base of the penis/upper scrotum, suggesting cellulitis. Recommend direct inspection. Both testes located in the distal inguinal canals, suggesting cryptorchidism.4. Indeterminate 1.3 cm hypodense right hepatic lobe lesion. Outpatient MRI abdomen with and without contrast can be obtained for further evaluation.\"  C diff ordered  Echo ordered  Urine ag ordered  Sputum cx  MRSA cx  Toradol for severe pain  Trend WBC and fever curve  "

## 2024-02-24 NOTE — PROGRESS NOTES
Eh Chauhan is a 33 y.o. male who is currently ordered Vancomycin IV with management by the Pharmacy Consult service.  Relevant clinical data and objective / subjective history reviewed.  Vancomycin Assessment:  Indication and Goal AUC/Trough: Bone/joint infection (goal -600, trough >10), -600, trough >10  Clinical Status: stable  Micro:   Pending  Renal Function:  SCr: 0.61 mg/dL  CrCl: 186.9 mL/min  Renal replacement: Not on dialysis  Days of Therapy: 2  Current Dose: 1250mg every 12 hours  Vancomycin Plan:  New Dosing: No change  Estimated AUC: 46.3 mcg*hr/mL  Estimated Trough: 13.3 mcg/mL  Next Level: 2/25/24 at 0600  Renal Function Monitoring: Daily BMP and UOP  Pharmacy will continue to follow closely for s/sx of nephrotoxicity, infusion reactions and appropriateness of therapy.  BMP and CBC will be ordered per protocol. We will continue to follow the patient’s culture results and clinical progress daily. Also, cefepime will be adjusted if necessary.    Landon Max, Pharmacist, PharmD, BCPS

## 2024-02-24 NOTE — ASSESSMENT & PLAN NOTE
Comes from [facility] for where he has been undergoing opioid detox  Discussed with toxicology who was consulted and recommended:  Continue suboxone 8 mg bid to started the evening of 2/23    Discussed case with toxicology again will give 1x dose of ativan   UDS + benzos  Placed on valium prn    Denies any other drug use

## 2024-02-24 NOTE — PLAN OF CARE
Problem: Potential for Falls  Goal: Patient will remain free of falls  Description: INTERVENTIONS:  - Educate patient/family on patient safety including physical limitations  - Instruct patient to call for assistance with activity   - Consult OT/PT to assist with strengthening/mobility   - Keep Call bell within reach  - Keep bed low and locked with side rails adjusted as appropriate  - Keep care items and personal belongings within reach  - Initiate and maintain comfort rounds  - Make Fall Risk Sign visible to staff  - Offer Toileting every 2 Hours, in advance of need  - Initiate/Maintain bed alarm  - Obtain necessary fall risk management equipment: non slip socks  - Apply yellow socks and bracelet for high fall risk patients  - Consider moving patient to room near nurses station  Outcome: Progressing     Problem: Prexisting or High Potential for Compromised Skin Integrity  Goal: Skin integrity is maintained or improved  Description: INTERVENTIONS:  - Identify patients at risk for skin breakdown  - Assess and monitor skin integrity  - Assess and monitor nutrition and hydration status  - Monitor labs   - Assess for incontinence   - Turn and reposition patient  - Assist with mobility/ambulation  - Relieve pressure over bony prominences  - Avoid friction and shearing  - Provide appropriate hygiene as needed including keeping skin clean and dry  - Evaluate need for skin moisturizer/barrier cream  - Collaborate with interdisciplinary team   - Patient/family teaching  - Consider wound care consult   Outcome: Progressing     Problem: PAIN - ADULT  Goal: Verbalizes/displays adequate comfort level or baseline comfort level  Description: Interventions:  - Encourage patient to monitor pain and request assistance  - Assess pain using appropriate pain scale  - Administer analgesics based on type and severity of pain and evaluate response  - Implement non-pharmacological measures as appropriate and evaluate response  - Consider  cultural and social influences on pain and pain management  - Notify physician/advanced practitioner if interventions unsuccessful or patient reports new pain  Outcome: Progressing     Problem: INFECTION - ADULT  Goal: Absence or prevention of progression during hospitalization  Description: INTERVENTIONS:  - Assess and monitor for signs and symptoms of infection  - Monitor lab/diagnostic results  - Monitor all insertion sites, i.e. indwelling lines, tubes, and drains  - Monitor endotracheal if appropriate and nasal secretions for changes in amount and color  - [place] appropriate cooling/warming therapies per order  - Administer medications as ordered  - Instruct and encourage patient and family to use good hand hygiene technique  - Identify and instruct in appropriate isolation precautions for identified infection/condition  Outcome: Progressing  Goal: Absence of fever/infection during neutropenic period  Description: INTERVENTIONS:  - Monitor WBC    Outcome: Progressing     Problem: SAFETY ADULT  Goal: Patient will remain free of falls  Description: INTERVENTIONS:  - Educate patient/family on patient safety including physical limitations  - Instruct patient to call for assistance with activity   - Consult OT/PT to assist with strengthening/mobility   - Keep Call bell within reach  - Keep bed low and locked with side rails adjusted as appropriate  - Keep care items and personal belongings within reach  - Initiate and maintain comfort rounds  - Make Fall Risk Sign visible to staff  - Offer Toileting every 2 Hours, in advance of need  - Initiate/Maintain bed alarm  - Obtain necessary fall risk management equipment: non slip socks  - Apply yellow socks and bracelet for high fall risk patients  - Consider moving patient to room near nurses station  Outcome: Progressing  Goal: Maintain or return to baseline ADL function  Description: INTERVENTIONS:  - Educate patient/family on patient safety including physical  limitations  - Instruct patient to call for assistance with activity   - Consult OT/PT to assist with strengthening/mobility   - Keep Call bell within reach  - Keep bed low and locked with side rails adjusted as appropriate  - Keep care items and personal belongings within reach  - Initiate and maintain comfort rounds  - Make Fall Risk Sign visible to staff  - Offer Toileting every 2 Hours, in advance of need  - Initiate/Maintain bed alarm  - Obtain necessary fall risk management equipment: non slip socks  - Apply yellow socks and bracelet for high fall risk patients  - Consider moving patient to room near nurses station  Outcome: Progressing  Goal: Maintains/Returns to pre admission functional level  Description: INTERVENTIONS:  - Perform AM-PAC 6 Click Basic Mobility/ Daily Activity assessment daily.  - Set and communicate daily mobility goal to care team and patient/family/caregiver.   - Collaborate with rehabilitation services on mobility goals if consulted  - Perform Range of Motion 5 times a day.  - Reposition patient every 2 hours.  - Dangle patient 3 times a day  - Stand patient 3 times a day  - Ambulate patient 3 times a day  - Out of bed to chair 3 times a day   - Out of bed for meals 3 times a day  - Out of bed for toileting  - Record patient progress and toleration of activity level   Outcome: Progressing     Problem: DISCHARGE PLANNING  Goal: Discharge to home or other facility with appropriate resources  Description: INTERVENTIONS:  - Identify barriers to discharge w/patient and caregiver  - Arrange for needed discharge resources and transportation as appropriate  - Identify discharge learning needs (meds, wound care, etc.)  - Arrange for interpretive services to assist at discharge as needed  - Refer to Case Management Department for coordinating discharge planning if the patient needs post-hospital services based on physician/advanced practitioner order or complex needs related to functional status,  cognitive ability, or social support system  Outcome: Progressing     Problem: Knowledge Deficit  Goal: Patient/family/caregiver demonstrates understanding of disease process, treatment plan, medications, and discharge instructions  Description: Complete learning assessment and assess knowledge base.  Interventions:  - Provide teaching at level of understanding  - Provide teaching via preferred learning methods  Outcome: Progressing

## 2024-02-24 NOTE — ASSESSMENT & PLAN NOTE
Pt presenting d/t worsening right LE pain and possible infection    Initially presented on 1/3/24 where he was found to have a calcaneal fracture after jumping over a fence near [facility] for which he was to follow-up with Ortho however was unable to follow-up    On 2/21/2024 had further pain and was told that there was a possible infection and placed on antibiotics. Pt failed abx and was told to come to the ED for further evaluation    Pt meeting sepsis criteria d/t tachypnea and leukocytosis of 17.58 with a source being osteomyelitis  Patient was given Vanco and cefepime and 1 L of fluids in the ED  Lactic acid 2.1  ESR 69  CRP 10  Discussed with podiatry for home was consulted and recommended continuation of antibiotics and MRI    blood cultures pending  Continue Vanco and cefepime day 1  MRI showing no osteo at this time  Toradol for severe pain

## 2024-02-24 NOTE — PROGRESS NOTES
"Progress Note - Podiatry  Eh Chauhan 33 y.o. male MRN: [id number]  Unit/Bed#: -01 Encounter: [id number]    Assessment:  Eh Chauhan is a 33-year-old male with:    Right calcaneal fracture  Leukocytosis  Opioid abuse currently detoxing, hypertension    Plan:  Patient seen at bedside, bilateral pedal and lower extremity examination was performed.  I personally reviewed patient's medical records, MRI images and results from yesterday, I agree with radiologist interpretation, of expected.  Healing calcaneal fractures no signs of osteomyelitis noted, clinically patient's foot is had significant decrease in erythema, there is no fluctuance, no crepitus, no clinical signs for any further imaging at this time.  I reviewed patient's blood work, procalcitonin, lactic acid are now within normal limits, leukocytosis is downtrending.  Continue IV antibiotics per primary team  No surgical intervention is necessary at this time from a podiatric perspective.  Patient may maintain weightbearing as tolerated with Cam boot and crutches which she already has.  At this time as all podiatric needs have been met we will sign off, please reconsult as necessary.  Follow-up orders have been placed for patient to follow-up with [facility] podiatry either in [place] or [place], staff will call to schedule appointment.  Remainder of care per primary team.    Subjective/Objective   Chief Complaint:   Chief Complaint   Patient presents with    Wound Check     Pt reports fracturing his right heel and then got an infection in the bone. Started oral abx today. Came to er to \"get it checked out\". Reports he did not get it checked before being placed on abx, and the abx came from [facility] rehab.        Subjective: Patient seen at bedside for right calcaneal fracture currently being worked up for osteomyelitis, he states his foot pain continues to be resolved from yesterday, he has been out of bed with crutches, he knows he is detoxing " "and is sweating a lot from this but he is feeling better than he did yesterday.    Blood pressure 138/90, pulse 86, temperature 98.1 °F (36.7 °C), temperature source Oral, resp. rate (!) 33, height 5' 11\" (1.803 m), weight 79.4 kg (175 lb), SpO2 99%.,Body mass index is 24.41 kg/m².    Invasive Devices       Peripheral Intravenous Line  Duration             Peripheral IV 02/23/24 Left Antecubital 1 day                    Physical Exam:   General appearance: alert, cooperative and no distress    Extremity:   Left foot is within normal limits    Right foot, no erythema is present, foot is no longer warm to touch, patient is diaphoretic to bilateral lower extremities, there is no pain on palpation of calcaneus, no pain on range of motion of ankle and subtalar joint.    No open wounds are noted.              Lab, Imaging and other studies:   Admission on 02/23/2024   Component Date Value    WBC 02/23/2024 17.58 (H)     RBC 02/23/2024 5.34     Hemoglobin 02/23/2024 14.9     Hematocrit 02/23/2024 46.7     MCV 02/23/2024 88     MCH 02/23/2024 27.9     MCHC 02/23/2024 31.9     RDW 02/23/2024 12.9     MPV 02/23/2024 9.3     Platelets 02/23/2024 593 (H)     nRBC 02/23/2024 0     Neutrophils Relative 02/23/2024 77 (H)     Immat GRANS % 02/23/2024 1     Lymphocytes Relative 02/23/2024 14     Monocytes Relative 02/23/2024 8     Eosinophils Relative 02/23/2024 0     Basophils Relative 02/23/2024 0     Neutrophils Absolute 02/23/2024 13.56 (H)     Immature Grans Absolute 02/23/2024 0.19     Lymphocytes Absolute 02/23/2024 2.38     Monocytes Absolute 02/23/2024 1.40 (H)     Eosinophils Absolute 02/23/2024 0.01     Basophils Absolute 02/23/2024 0.04     Sodium 02/23/2024 141     Potassium 02/23/2024 3.6     Chloride 02/23/2024 102     CO2 02/23/2024 27     ANION GAP 02/23/2024 12     BUN 02/23/2024 15     Creatinine 02/23/2024 0.81     Glucose 02/23/2024 114     Calcium 02/23/2024 10.4 (H)     AST 02/23/2024 14     ALT 02/23/2024 26  "    Alkaline Phosphatase 02/23/2024 117 (H)     Total Protein 02/23/2024 9.1 (H)     Albumin 02/23/2024 4.8     Total Bilirubin 02/23/2024 0.48     eGFR 02/23/2024 118     CRP 02/23/2024 10.9 (H)     Sed Rate 02/23/2024 69 (H)     Blood Culture 02/23/2024 Received in Microbiology Lab. Culture in Progress.     Blood Culture 02/23/2024 Received in Microbiology Lab. Culture in Progress.     LACTIC ACID 02/23/2024 2.1 (HH)     Color, UA 02/23/2024 Yellow     Clarity, UA 02/23/2024 Clear     Specific Gravity, UA 02/23/2024 >=1.030     pH, UA 02/23/2024 6.0     Leukocytes, UA 02/23/2024 Negative     Nitrite, UA 02/23/2024 Negative     Protein, UA 02/23/2024 30 (1+) (A)     Glucose, UA 02/23/2024 30 (3/100%) (A)     Ketones, UA 02/23/2024 10 (1+) (A)     Urobilinogen, UA 02/23/2024 <2.0     Bilirubin, UA 02/23/2024 Negative     Occult Blood, UA 02/23/2024 Negative     LACTIC ACID 02/23/2024 0.9     RBC, UA 02/23/2024 0-1     WBC, UA 02/23/2024 0-1     Epithelial Cells 02/23/2024 Occasional     Bacteria, UA 02/23/2024 Innumerable (A)     Amorphous Crystals, UA 02/23/2024 Innumerable     Amph/Meth UR 02/23/2024 Negative     Barbiturate Ur 02/23/2024 Negative     Benzodiazepine Urine 02/23/2024 Positive (A)     Cocaine Urine 02/23/2024 Negative     Methadone Urine 02/23/2024 Negative     Opiate Urine 02/23/2024 Negative     PCP Ur 02/23/2024 Negative     THC Urine 02/23/2024 Negative     Oxycodone Urine 02/23/2024 Negative     WBC 02/24/2024 13.14 (H)     RBC 02/24/2024 4.35     Hemoglobin 02/24/2024 12.1     Hematocrit 02/24/2024 39.3     MCV 02/24/2024 90     MCH 02/24/2024 27.8     MCHC 02/24/2024 30.8 (L)     RDW 02/24/2024 13.2     MPV 02/24/2024 10.8     Platelets 02/24/2024 307     nRBC 02/24/2024 0     Neutrophils Relative 02/24/2024 72     Immat GRANS % 02/24/2024 1     Lymphocytes Relative 02/24/2024 19     Monocytes Relative 02/24/2024 8     Eosinophils Relative 02/24/2024 0     Basophils Relative 02/24/2024 0      Neutrophils Absolute 02/24/2024 9.33 (H)     Immature Grans Absolute 02/24/2024 0.13     Lymphocytes Absolute 02/24/2024 2.50     Monocytes Absolute 02/24/2024 1.10     Eosinophils Absolute 02/24/2024 0.03     Basophils Absolute 02/24/2024 0.05     Sodium 02/24/2024 140     Potassium 02/24/2024 3.8     Chloride 02/24/2024 108     CO2 02/24/2024 23     ANION GAP 02/24/2024 9     BUN 02/24/2024 16     Creatinine 02/24/2024 0.61     Glucose 02/24/2024 96     Calcium 02/24/2024 9.1     AST 02/24/2024 12 (L)     ALT 02/24/2024 18     Alkaline Phosphatase 02/24/2024 83     Total Protein 02/24/2024 6.9     Albumin 02/24/2024 3.7     Total Bilirubin 02/24/2024 0.56     eGFR 02/24/2024 133     Magnesium 02/24/2024 2.1     Procalcitonin 02/24/2024 <0.05      Imaging: I have personally reviewed pertinent films in PACS  EKG, Pathology, and Other Studies: I have personally reviewed pertinent reports.  VTE Pharmacologic Prophylaxis: Sequential compression device (Venodyne)   VTE Mechanical Prophylaxis: sequential compression device

## 2024-02-24 NOTE — H&P
Former[facility]  H&P  Name: Eh Chauhan 33 y.o. male I MRN: [id number]  Unit/Bed#: -01 I Date of Admission: 2/23/2024   Date of Service: 2/23/2024 I Hospital Day: 0      Assessment/Plan   * Osteomyelitis of right foot (HCC)  Assessment & Plan  Pt presenting d/t worsening right LE pain and possible infection    Initially presented on 1/3/24 where he was found to have a calcaneal fracture after jumping over a fence near [facility] for which he was to follow-up with Ortho however was unable to follow-up    On 2/21/2024 had further pain and was told that there was a possible infection and placed on antibiotics. Pt failed abx and was told to come to the ED for further evaluation    Pt meeting sepsis criteria d/t tachypnea and leukocytosis of 17.58 with a source being osteomyelitis  Patient was given Vanco and cefepime and 1 L of fluids in the ED  Lactic acid 2.1  ESR 69  CRP 10  Discussed with podiatry for home was consulted and recommended continuation of antibiotics and MRI    Follow-up blood cultures  Continue Vanco and cefepime  Follow-up MRI results  Toradol for severe pain    Fracture of right calcaneus  Assessment & Plan  See mgx above    Sepsis without acute organ dysfunction (HCC)  Assessment & Plan  See management above    Abnormal urinalysis  Assessment & Plan  UA showing innumerable bacteria   UC ordered    Opioid use disorder  Assessment & Plan  Comes from [facility] for where he has been undergoing opioid detox  Discussed with toxicology who was consulted and recommended:  Continue suboxone 8 mg bid to start this evening 2/23             VTE Pharmacologic Prophylaxis: VTE Score: 2 Low Risk (Score 0-2) - Encourage Ambulation.  Code Status: Level 1 - Full Code dull code  Discussion with family: Patient declined call to .     Anticipated Length of Stay: Patient will be admitted on an inpatient basis with an anticipated length of stay of greater than 2 midnights  secondary to sepsis 2/2 osteo.    Total Time Spent on Date of Encounter in care of patient: 75 mins. This time was spent on one or more of the following: performing physical exam; counseling and coordination of care; obtaining or reviewing history; documenting in the medical record; reviewing/ordering tests, medications or procedures; communicating with other healthcare professionals and discussing with patient's family/caregivers.    Chief Complaint: right foot pain    History of Present Illness:  Eh Chauhan is a 33 y.o. male with a PMH of.  Use disorder currently undergoing detox at [place] who presents with right foot pain.  This started several months ago on 1/3/2024 after jumping over a fence.  He was in the hospital and was found to have a calcaneal fracture at that time and was told to follow-up with orthopedics.  He was unable to follow-up.  Recently on 2/21/2024 had further worsening of pain had imaging done and was told that there was a possible infection.  He was started on antibiotics however it pain continued to worsen and was told to come to the ED.    Review of Systems:  Review of Systems   All other systems reviewed and are negative.      Past Medical and Surgical History:   Past Medical History:   Diagnosis Date    Hypertension        History reviewed. No pertinent surgical history.    Meds/Allergies:  Prior to Admission medications    Not on File     I have reviewed home medications using recent Epic encounter.    Allergies: No Known Allergies    Social History:  Marital Status: Single   Occupation:   Patient Pre-hospital Living Situation: [facility]   Patient Pre-hospital Level of Mobility: walks  Patient Pre-hospital Diet Restrictions: none  Substance Use History:   Social History     Substance and Sexual Activity   Alcohol Use Never     Social History     Tobacco Use   Smoking Status Never   Smokeless Tobacco Never     Social History     Substance and Sexual Activity   Drug Use Not Currently     "Types: Fentanyl, Heroin       Family History:  History reviewed. No pertinent family history.    Physical Exam:     Vitals:   Blood Pressure: 139/82 (02/23/24 1756)  Pulse: 79 (02/23/24 1756)  Temperature: 98.8 °F (37.1 °C) (02/23/24 1756)  Temp Source: Temporal (02/23/24 1154)  Respirations: 22 (02/23/24 1756)  Height: 5' 11\" (180.3 cm) (02/23/24 1152)  Weight - Scale: 79.4 kg (175 lb) (02/23/24 1152)  SpO2: 99 % (02/23/24 1756)    Physical Exam  Vitals and nursing note reviewed.   Constitutional:       General: He is not in acute distress.     Appearance: He is ill-appearing and diaphoretic.      Comments: shivering   HENT:      Head: Normocephalic and atraumatic.   Cardiovascular:      Rate and Rhythm: Normal rate and regular rhythm.      Pulses: Normal pulses.      Heart sounds: Normal heart sounds.   Pulmonary:      Effort: Pulmonary effort is normal.      Breath sounds: Normal breath sounds.   Abdominal:      General: Abdomen is flat. Bowel sounds are normal.      Palpations: Abdomen is soft.   Musculoskeletal:      Right lower leg: No edema.      Left lower leg: No edema.   Skin:     General: Skin is warm.   Neurological:      General: No focal deficit present.      Mental Status: He is alert and oriented to person, place, and time.          Additional Data:     Lab Results:  Results from last 7 days   Lab Units 02/23/24  1322   WBC Thousand/uL 17.58*   HEMOGLOBIN g/dL 14.9   HEMATOCRIT % 46.7   PLATELETS Thousands/uL 593*   NEUTROS PCT % 77*   LYMPHS PCT % 14   MONOS PCT % 8   EOS PCT % 0     Results from last 7 days   Lab Units 02/23/24  1322   SODIUM mmol/L 141   POTASSIUM mmol/L 3.6   CHLORIDE mmol/L 102   CO2 mmol/L 27   BUN mg/dL 15   CREATININE mg/dL 0.81   ANION GAP mmol/L 12   CALCIUM mg/dL 10.4*   ALBUMIN g/dL 4.8   TOTAL BILIRUBIN mg/dL 0.48   ALK PHOS U/L 117*   ALT U/L 26   AST U/L 14   GLUCOSE RANDOM mg/dL 114                 Results from last 7 days   Lab Units 02/23/24  1336   LACTIC ACID " mmol/L 2.1*       Lines/Drains:  Invasive Devices       Peripheral Intravenous Line  Duration             Peripheral IV 02/23/24 Left Antecubital <1 day                        Imaging:   CT lower extremity wo contrast right   Final Result by Cristian Delgado MD (02/23 1507)      Extensive comminuted calcaneus fracture as above. Mild diffuse osteopenia likely related to disuse.         Workstation performed: [id number]         XR chest 1 view portable   ED Interpretation by Melissa Jett PA-C (02/23 1446)   No acute abnormalities.       Final Result by Torsten Ambriz MD (02/23 1626)      No acute cardiopulmonary disease.            Workstation performed: [id number]             EKG and Other Studies Reviewed on Admission:   EKG: No EKG obtained.    ** Please Note: This note has been constructed using a voice recognition system. **

## 2024-02-25 ENCOUNTER — APPOINTMENT (INPATIENT)
Dept: RADIOLOGY | Facility: HOSPITAL | Age: 34
DRG: 720 | End: 2024-02-25
Payer: COMMERCIAL

## 2024-02-25 ENCOUNTER — APPOINTMENT (INPATIENT)
Dept: CT IMAGING | Facility: HOSPITAL | Age: 34
DRG: 720 | End: 2024-02-25
Payer: COMMERCIAL

## 2024-02-25 PROBLEM — J18.9 SEPSIS DUE TO PNEUMONIA (HCC): Status: ACTIVE | Noted: 2024-02-23

## 2024-02-25 PROBLEM — L03.115 CELLULITIS OF RIGHT LOWER EXTREMITY: Status: ACTIVE | Noted: 2024-02-23

## 2024-02-25 PROBLEM — R82.90 ABNORMAL URINALYSIS: Status: RESOLVED | Noted: 2024-02-23 | Resolved: 2024-02-25

## 2024-02-25 PROBLEM — A41.9 SEPSIS DUE TO PNEUMONIA (HCC): Status: ACTIVE | Noted: 2024-02-23

## 2024-02-25 LAB
ALBUMIN SERPL BCP-MCNC: 3.8 G/DL (ref 3.5–5)
ALP SERPL-CCNC: 81 U/L (ref 34–104)
ALT SERPL W P-5'-P-CCNC: 16 U/L (ref 7–52)
ANION GAP SERPL CALCULATED.3IONS-SCNC: 10 MMOL/L
ANION GAP SERPL CALCULATED.3IONS-SCNC: 8 MMOL/L
AST SERPL W P-5'-P-CCNC: 15 U/L (ref 13–39)
ATRIAL RATE: 81 BPM
ATRIAL RATE: 85 BPM
BACTERIA UR CULT: NORMAL
BASOPHILS # BLD AUTO: 0.05 THOUSANDS/ÂΜL (ref 0–0.1)
BASOPHILS NFR BLD AUTO: 0 % (ref 0–1)
BILIRUB DIRECT SERPL-MCNC: 0.09 MG/DL (ref 0–0.2)
BILIRUB SERPL-MCNC: 0.61 MG/DL (ref 0.2–1)
BUN SERPL-MCNC: 10 MG/DL (ref 5–25)
BUN SERPL-MCNC: 14 MG/DL (ref 5–25)
CALCIUM SERPL-MCNC: 8.9 MG/DL (ref 8.4–10.2)
CALCIUM SERPL-MCNC: 9 MG/DL (ref 8.4–10.2)
CHLORIDE SERPL-SCNC: 104 MMOL/L (ref 96–108)
CHLORIDE SERPL-SCNC: 105 MMOL/L (ref 96–108)
CO2 SERPL-SCNC: 26 MMOL/L (ref 21–32)
CO2 SERPL-SCNC: 27 MMOL/L (ref 21–32)
CREAT SERPL-MCNC: 0.6 MG/DL (ref 0.6–1.3)
CREAT SERPL-MCNC: 0.65 MG/DL (ref 0.6–1.3)
EOSINOPHIL # BLD AUTO: 0.02 THOUSAND/ÂΜL (ref 0–0.61)
EOSINOPHIL NFR BLD AUTO: 0 % (ref 0–6)
ERYTHROCYTE [DISTWIDTH] IN BLOOD BY AUTOMATED COUNT: 12.9 % (ref 11.6–15.1)
GFR SERPL CREATININE-BSD FRML MDRD: 129 ML/MIN/1.73SQ M
GFR SERPL CREATININE-BSD FRML MDRD: 133 ML/MIN/1.73SQ M
GLUCOSE SERPL-MCNC: 94 MG/DL (ref 65–140)
GLUCOSE SERPL-MCNC: 94 MG/DL (ref 65–140)
HCT VFR BLD AUTO: 40.9 % (ref 36.5–49.3)
HGB BLD-MCNC: 12.6 G/DL (ref 12–17)
IMM GRANULOCYTES # BLD AUTO: 0.16 THOUSAND/UL (ref 0–0.2)
IMM GRANULOCYTES NFR BLD AUTO: 1 % (ref 0–2)
LIPASE SERPL-CCNC: 29 U/L (ref 11–82)
LYMPHOCYTES # BLD AUTO: 3 THOUSANDS/ÂΜL (ref 0.6–4.47)
LYMPHOCYTES NFR BLD AUTO: 25 % (ref 14–44)
MAGNESIUM SERPL-MCNC: 2.3 MG/DL (ref 1.9–2.7)
MCH RBC QN AUTO: 27.6 PG (ref 26.8–34.3)
MCHC RBC AUTO-ENTMCNC: 30.8 G/DL (ref 31.4–37.4)
MCV RBC AUTO: 90 FL (ref 82–98)
MONOCYTES # BLD AUTO: 1.12 THOUSAND/ÂΜL (ref 0.17–1.22)
MONOCYTES NFR BLD AUTO: 9 % (ref 4–12)
NEUTROPHILS # BLD AUTO: 7.89 THOUSANDS/ÂΜL (ref 1.85–7.62)
NEUTS SEG NFR BLD AUTO: 65 % (ref 43–75)
NRBC BLD AUTO-RTO: 0 /100 WBCS
P AXIS: 44 DEGREES
P AXIS: 56 DEGREES
PLATELET # BLD AUTO: 368 THOUSANDS/UL (ref 149–390)
PMV BLD AUTO: 9.5 FL (ref 8.9–12.7)
POTASSIUM SERPL-SCNC: 3.4 MMOL/L (ref 3.5–5.3)
POTASSIUM SERPL-SCNC: 3.5 MMOL/L (ref 3.5–5.3)
PR INTERVAL: 120 MS
PR INTERVAL: 122 MS
PROCALCITONIN SERPL-MCNC: <0.05 NG/ML
PROT SERPL-MCNC: 6.9 G/DL (ref 6.4–8.4)
QRS AXIS: 10 DEGREES
QRS AXIS: 10 DEGREES
QRSD INTERVAL: 80 MS
QRSD INTERVAL: 80 MS
QT INTERVAL: 380 MS
QT INTERVAL: 386 MS
QTC INTERVAL: 448 MS
QTC INTERVAL: 452 MS
RBC # BLD AUTO: 4.57 MILLION/UL (ref 3.88–5.62)
SODIUM SERPL-SCNC: 139 MMOL/L (ref 135–147)
SODIUM SERPL-SCNC: 141 MMOL/L (ref 135–147)
T WAVE AXIS: 46 DEGREES
T WAVE AXIS: 59 DEGREES
VANCOMYCIN SERPL-MCNC: 8.2 UG/ML (ref 10–20)
VENTRICULAR RATE: 81 BPM
VENTRICULAR RATE: 85 BPM
WBC # BLD AUTO: 12.24 THOUSAND/UL (ref 4.31–10.16)

## 2024-02-25 PROCEDURE — 71260 CT THORAX DX C+: CPT

## 2024-02-25 PROCEDURE — 93010 ELECTROCARDIOGRAM REPORT: CPT | Performed by: INTERNAL MEDICINE

## 2024-02-25 PROCEDURE — 94760 N-INVAS EAR/PLS OXIMETRY 1: CPT

## 2024-02-25 PROCEDURE — 87081 CULTURE SCREEN ONLY: CPT | Performed by: STUDENT IN AN ORGANIZED HEALTH CARE EDUCATION/TRAINING PROGRAM

## 2024-02-25 PROCEDURE — 87147 CULTURE TYPE IMMUNOLOGIC: CPT | Performed by: STUDENT IN AN ORGANIZED HEALTH CARE EDUCATION/TRAINING PROGRAM

## 2024-02-25 PROCEDURE — 80202 ASSAY OF VANCOMYCIN: CPT | Performed by: PHYSICIAN ASSISTANT

## 2024-02-25 PROCEDURE — 83735 ASSAY OF MAGNESIUM: CPT | Performed by: PHYSICIAN ASSISTANT

## 2024-02-25 PROCEDURE — 85025 COMPLETE CBC W/AUTO DIFF WBC: CPT | Performed by: PHYSICIAN ASSISTANT

## 2024-02-25 PROCEDURE — 84145 PROCALCITONIN (PCT): CPT | Performed by: PHYSICIAN ASSISTANT

## 2024-02-25 PROCEDURE — 80048 BASIC METABOLIC PNL TOTAL CA: CPT | Performed by: PHYSICIAN ASSISTANT

## 2024-02-25 PROCEDURE — 99233 SBSQ HOSP IP/OBS HIGH 50: CPT | Performed by: STUDENT IN AN ORGANIZED HEALTH CARE EDUCATION/TRAINING PROGRAM

## 2024-02-25 PROCEDURE — 0202U NFCT DS 22 TRGT SARS-COV-2: CPT | Performed by: STUDENT IN AN ORGANIZED HEALTH CARE EDUCATION/TRAINING PROGRAM

## 2024-02-25 PROCEDURE — 80076 HEPATIC FUNCTION PANEL: CPT | Performed by: PHYSICIAN ASSISTANT

## 2024-02-25 PROCEDURE — 83690 ASSAY OF LIPASE: CPT | Performed by: PHYSICIAN ASSISTANT

## 2024-02-25 PROCEDURE — 87493 C DIFF AMPLIFIED PROBE: CPT | Performed by: STUDENT IN AN ORGANIZED HEALTH CARE EDUCATION/TRAINING PROGRAM

## 2024-02-25 PROCEDURE — 74018 RADEX ABDOMEN 1 VIEW: CPT

## 2024-02-25 PROCEDURE — 87449 NOS EACH ORGANISM AG IA: CPT | Performed by: STUDENT IN AN ORGANIZED HEALTH CARE EDUCATION/TRAINING PROGRAM

## 2024-02-25 PROCEDURE — 94664 DEMO&/EVAL PT USE INHALER: CPT

## 2024-02-25 PROCEDURE — 74177 CT ABD & PELVIS W/CONTRAST: CPT

## 2024-02-25 RX ORDER — AZITHROMYCIN 500 MG/1
500 TABLET, FILM COATED ORAL EVERY 24 HOURS
Status: DISCONTINUED | OUTPATIENT
Start: 2024-02-25 | End: 2024-02-25

## 2024-02-25 RX ORDER — POTASSIUM CHLORIDE 14.9 MG/ML
20 INJECTION INTRAVENOUS
Status: COMPLETED | OUTPATIENT
Start: 2024-02-25 | End: 2024-02-25

## 2024-02-25 RX ORDER — ACETAMINOPHEN 10 MG/ML
1000 INJECTION, SOLUTION INTRAVENOUS ONCE
Status: COMPLETED | OUTPATIENT
Start: 2024-02-25 | End: 2024-02-25

## 2024-02-25 RX ORDER — METOCLOPRAMIDE HYDROCHLORIDE 5 MG/ML
10 INJECTION INTRAMUSCULAR; INTRAVENOUS ONCE
Status: COMPLETED | OUTPATIENT
Start: 2024-02-25 | End: 2024-02-25

## 2024-02-25 RX ORDER — POTASSIUM CHLORIDE 14.9 MG/ML
20 INJECTION INTRAVENOUS 3 TIMES DAILY
Status: DISCONTINUED | OUTPATIENT
Start: 2024-02-25 | End: 2024-02-29

## 2024-02-25 RX ORDER — DIPHENHYDRAMINE HYDROCHLORIDE 50 MG/ML
25 INJECTION INTRAMUSCULAR; INTRAVENOUS ONCE
Status: COMPLETED | OUTPATIENT
Start: 2024-02-25 | End: 2024-02-25

## 2024-02-25 RX ORDER — SODIUM CHLORIDE, SODIUM GLUCONATE, SODIUM ACETATE, POTASSIUM CHLORIDE, MAGNESIUM CHLORIDE, SODIUM PHOSPHATE, DIBASIC, AND POTASSIUM PHOSPHATE .53; .5; .37; .037; .03; .012; .00082 G/100ML; G/100ML; G/100ML; G/100ML; G/100ML; G/100ML; G/100ML
1000 INJECTION, SOLUTION INTRAVENOUS ONCE
Status: COMPLETED | OUTPATIENT
Start: 2024-02-25 | End: 2024-02-25

## 2024-02-25 RX ORDER — POTASSIUM CHLORIDE 20 MEQ/1
40 TABLET, EXTENDED RELEASE ORAL 2 TIMES DAILY
Status: DISCONTINUED | OUTPATIENT
Start: 2024-02-25 | End: 2024-02-25

## 2024-02-25 RX ORDER — METOCLOPRAMIDE HYDROCHLORIDE 5 MG/ML
10 INJECTION INTRAMUSCULAR; INTRAVENOUS EVERY 6 HOURS PRN
Status: DISCONTINUED | OUTPATIENT
Start: 2024-02-25 | End: 2024-03-02 | Stop reason: HOSPADM

## 2024-02-25 RX ORDER — GUAIFENESIN 600 MG/1
600 TABLET, EXTENDED RELEASE ORAL EVERY 12 HOURS SCHEDULED
Status: DISCONTINUED | OUTPATIENT
Start: 2024-02-25 | End: 2024-03-02 | Stop reason: HOSPADM

## 2024-02-25 RX ORDER — BENZONATATE 100 MG/1
100 CAPSULE ORAL 3 TIMES DAILY PRN
Status: DISCONTINUED | OUTPATIENT
Start: 2024-02-25 | End: 2024-03-02 | Stop reason: HOSPADM

## 2024-02-25 RX ADMIN — SODIUM CHLORIDE, SODIUM GLUCONATE, SODIUM ACETATE, POTASSIUM CHLORIDE, MAGNESIUM CHLORIDE, SODIUM PHOSPHATE, DIBASIC, AND POTASSIUM PHOSPHATE 75 ML/HR: .53; .5; .37; .037; .03; .012; .00082 INJECTION, SOLUTION INTRAVENOUS at 21:18

## 2024-02-25 RX ADMIN — BUPRENORPHINE AND NALOXONE 8 MG: 8; 2 FILM BUCCAL; SUBLINGUAL at 09:07

## 2024-02-25 RX ADMIN — AZITHROMYCIN MONOHYDRATE 500 MG: 500 INJECTION, POWDER, LYOPHILIZED, FOR SOLUTION INTRAVENOUS at 13:43

## 2024-02-25 RX ADMIN — SODIUM CHLORIDE, SODIUM GLUCONATE, SODIUM ACETATE, POTASSIUM CHLORIDE, MAGNESIUM CHLORIDE, SODIUM PHOSPHATE, DIBASIC, AND POTASSIUM PHOSPHATE 1000 ML: .53; .5; .37; .037; .03; .012; .00082 INJECTION, SOLUTION INTRAVENOUS at 12:37

## 2024-02-25 RX ADMIN — DIPHENHYDRAMINE HYDROCHLORIDE 25 MG: 50 INJECTION, SOLUTION INTRAMUSCULAR; INTRAVENOUS at 02:36

## 2024-02-25 RX ADMIN — ACETAMINOPHEN 1000 MG: 10 INJECTION INTRAVENOUS at 02:36

## 2024-02-25 RX ADMIN — CEFEPIME HYDROCHLORIDE 2000 MG: 2 INJECTION, SOLUTION INTRAVENOUS at 04:30

## 2024-02-25 RX ADMIN — GUAIFENESIN 600 MG: 600 TABLET ORAL at 22:50

## 2024-02-25 RX ADMIN — CEFEPIME HYDROCHLORIDE 2000 MG: 2 INJECTION, SOLUTION INTRAVENOUS at 21:18

## 2024-02-25 RX ADMIN — POTASSIUM CHLORIDE 20 MEQ: 14.9 INJECTION, SOLUTION INTRAVENOUS at 04:30

## 2024-02-25 RX ADMIN — METOCLOPRAMIDE 10 MG: 5 INJECTION, SOLUTION INTRAMUSCULAR; INTRAVENOUS at 04:30

## 2024-02-25 RX ADMIN — POTASSIUM CHLORIDE 20 MEQ: 14.9 INJECTION, SOLUTION INTRAVENOUS at 06:51

## 2024-02-25 RX ADMIN — BUPRENORPHINE AND NALOXONE 8 MG: 8; 2 FILM BUCCAL; SUBLINGUAL at 18:36

## 2024-02-25 RX ADMIN — CEFEPIME HYDROCHLORIDE 2000 MG: 2 INJECTION, SOLUTION INTRAVENOUS at 13:23

## 2024-02-25 RX ADMIN — Medication 6 MG: at 21:18

## 2024-02-25 RX ADMIN — DIAZEPAM 5 MG: 5 TABLET ORAL at 08:28

## 2024-02-25 RX ADMIN — SODIUM CHLORIDE, SODIUM GLUCONATE, SODIUM ACETATE, POTASSIUM CHLORIDE, MAGNESIUM CHLORIDE, SODIUM PHOSPHATE, DIBASIC, AND POTASSIUM PHOSPHATE 75 ML/HR: .53; .5; .37; .037; .03; .012; .00082 INJECTION, SOLUTION INTRAVENOUS at 06:53

## 2024-02-25 RX ADMIN — POTASSIUM CHLORIDE 20 MEQ: 14.9 INJECTION, SOLUTION INTRAVENOUS at 15:40

## 2024-02-25 RX ADMIN — DIAZEPAM 5 MG: 5 TABLET ORAL at 21:26

## 2024-02-25 RX ADMIN — IOHEXOL 100 ML: 350 INJECTION, SOLUTION INTRAVENOUS at 09:50

## 2024-02-25 RX ADMIN — METOCLOPRAMIDE 10 MG: 5 INJECTION, SOLUTION INTRAMUSCULAR; INTRAVENOUS at 16:22

## 2024-02-25 NOTE — PLAN OF CARE
Problem: Potential for Falls  Goal: Patient will remain free of falls  Description: INTERVENTIONS:  - Educate patient/family on patient safety including physical limitations  - Instruct patient to call for assistance with activity   - Consult OT/PT to assist with strengthening/mobility   - Keep Call bell within reach  - Keep bed low and locked with side rails adjusted as appropriate  - Keep care items and personal belongings within reach  - Initiate and maintain comfort rounds  - Make Fall Risk Sign visible to staff  - Offer Toileting every 2 Hours, in advance of need  - Initiate/Maintain bed alarm  - Obtain necessary fall risk management equipment: non slip socks  - Apply yellow socks and bracelet for high fall risk patients  - Consider moving patient to room near nurses station  Outcome: Progressing     Problem: Prexisting or High Potential for Compromised Skin Integrity  Goal: Skin integrity is maintained or improved  Description: INTERVENTIONS:  - Identify patients at risk for skin breakdown  - Assess and monitor skin integrity  - Assess and monitor nutrition and hydration status  - Monitor labs   - Assess for incontinence   - Turn and reposition patient  - Assist with mobility/ambulation  - Relieve pressure over bony prominences  - Avoid friction and shearing  - Provide appropriate hygiene as needed including keeping skin clean and dry  - Evaluate need for skin moisturizer/barrier cream  - Collaborate with interdisciplinary team   - Patient/family teaching  - Consider wound care consult   Outcome: Progressing     Problem: PAIN - ADULT  Goal: Verbalizes/displays adequate comfort level or baseline comfort level  Description: Interventions:  - Encourage patient to monitor pain and request assistance  - Assess pain using appropriate pain scale  - Administer analgesics based on type and severity of pain and evaluate response  - Implement non-pharmacological measures as appropriate and evaluate response  - Consider  cultural and social influences on pain and pain management  - Notify physician/advanced practitioner if interventions unsuccessful or patient reports new pain  Outcome: Progressing     Problem: INFECTION - ADULT  Goal: Absence or prevention of progression during hospitalization  Description: INTERVENTIONS:  - Assess and monitor for signs and symptoms of infection  - Monitor lab/diagnostic results  - Monitor all insertion sites, i.e. indwelling lines, tubes, and drains  - Monitor endotracheal if appropriate and nasal secretions for changes in amount and color  - Reagan appropriate cooling/warming therapies per order  - Administer medications as ordered  - Instruct and encourage patient and family to use good hand hygiene technique  - Identify and instruct in appropriate isolation precautions for identified infection/condition  Outcome: Progressing  Goal: Absence of fever/infection during neutropenic period  Description: INTERVENTIONS:  - Monitor WBC    Outcome: Progressing     Problem: SAFETY ADULT  Goal: Patient will remain free of falls  Description: INTERVENTIONS:  - Educate patient/family on patient safety including physical limitations  - Instruct patient to call for assistance with activity   - Consult OT/PT to assist with strengthening/mobility   - Keep Call bell within reach  - Keep bed low and locked with side rails adjusted as appropriate  - Keep care items and personal belongings within reach  - Initiate and maintain comfort rounds  - Make Fall Risk Sign visible to staff  - Offer Toileting every 2 Hours, in advance of need  - Initiate/Maintain bed alarm  - Obtain necessary fall risk management equipment: non slip socks  - Apply yellow socks and bracelet for high fall risk patients  - Consider moving patient to room near nurses station  Outcome: Progressing  Goal: Maintain or return to baseline ADL function  Description: INTERVENTIONS:  - Educate patient/family on patient safety including physical  limitations  - Instruct patient to call for assistance with activity   - Consult OT/PT to assist with strengthening/mobility   - Keep Call bell within reach  - Keep bed low and locked with side rails adjusted as appropriate  - Keep care items and personal belongings within reach  - Initiate and maintain comfort rounds  - Make Fall Risk Sign visible to staff  - Offer Toileting every 2 Hours, in advance of need  - Initiate/Maintain bed alarm  - Obtain necessary fall risk management equipment: non slip socks  - Apply yellow socks and bracelet for high fall risk patients  - Consider moving patient to room near nurses station  Outcome: Progressing  Goal: Maintains/Returns to pre admission functional level  Description: INTERVENTIONS:  - Perform AM-PAC 6 Click Basic Mobility/ Daily Activity assessment daily.  - Set and communicate daily mobility goal to care team and patient/family/caregiver.   - Collaborate with rehabilitation services on mobility goals if consulted  - Perform Range of Motion 3 times a day.  - Reposition patient every 2 hours.  - Dangle patient 3 times a day  - Stand patient 3 times a day  - Ambulate patient 3 times a day  - Out of bed to chair 3 times a day   - Out of bed for meals 3 times a day  - Out of bed for toileting  - Record patient progress and toleration of activity level   Outcome: Progressing     Problem: DISCHARGE PLANNING  Goal: Discharge to home or other facility with appropriate resources  Description: INTERVENTIONS:  - Identify barriers to discharge w/patient and caregiver  - Arrange for needed discharge resources and transportation as appropriate  - Identify discharge learning needs (meds, wound care, etc.)  - Arrange for interpretive services to assist at discharge as needed  - Refer to Case Management Department for coordinating discharge planning if the patient needs post-hospital services based on physician/advanced practitioner order or complex needs related to functional status,  cognitive ability, or social support system  Outcome: Progressing     Problem: Knowledge Deficit  Goal: Patient/family/caregiver demonstrates understanding of disease process, treatment plan, medications, and discharge instructions  Description: Complete learning assessment and assess knowledge base.  Interventions:  - Provide teaching at level of understanding  - Provide teaching via preferred learning methods  Outcome: Progressing

## 2024-02-25 NOTE — QUICK NOTE
"Alerted by nursing that shortly after initiating vancomycin, patient felt flushed and developed a red, patchy rash on his face, chest, and shoulders. He had a similar reaction the previous evening and earlier in the day today concerning for red man syndrome. Vancomycin was held, he was given IV benadryl, and vancomycin restarted at half the previous rate. Despite these interventions the patient continued to feel \"not right\", hot, flushed, and congested. He was diaphoretic with stable rash on his face, chest, and shoulders. No evidence of airway compromise. The decision was made to hold the vancomycin and discontinue future doses. As there is currently no concern for osteomyelitis on MRI and per podiatry, will continue with just cefepime for now for possible UTI. Vancomycin added to allergy list.  "

## 2024-02-25 NOTE — PROGRESS NOTES
[facility]  Progress Note  Name: Eh Chauhan I  MRN: [id number]  Unit/Bed#: -01 I Date of Admission: 2/23/2024   Date of Service: 2/25/2024 I Hospital Day: 2    Assessment/Plan   * Cellulitis of right lower extremity  Assessment & Plan  Pt presenting d/t worsening right LE pain and possible infection    Initially presented on 1/3/24 where he was found to have a calcaneal fracture after jumping over a fence near [facility] for which he was to follow-up with Ortho however was unable to follow-up    On 2/21/2024 had further pain and was told that there was a possible infection and placed on antibiotics. Pt failed abx and was told to come to the ED for further evaluation    Pt meeting sepsis criteria d/t tachypnea and leukocytosis of 17.58 with a source being osteomyelitis  Patient was given Vanco and cefepime and 1 L of fluids in the ED  Lactic acid 2.1  ESR 69  CRP 10  Discussed with podiatry for home was consulted and recommended continuation of antibiotics and MRI    blood cultures NGTD @ 24h  Continue cefepime day 2  Dc'd vanc d/t possible allergic reaction-> continue to montior off of vanc for now if temp increases will order linezolid   MRI showing no osteo at this time  CT c/a/pelvis pending  C diff ordered  Echo ordered  Toradol for severe pain    Last fever 100.3 0423 on 2/25-> meeting sepsis criteria    Fracture of right calcaneus  Assessment & Plan  See mgx above    Sepsis without acute organ dysfunction (HCC)  Assessment & Plan  See management above    Abnormal urinalysis  Assessment & Plan  UA showing innumerable bacteria   UC showing no growth    Opioid use disorder  Assessment & Plan  Comes from [facility] for where he has been undergoing opioid detox  Discussed with toxicology who was consulted and recommended:  Continue suboxone 8 mg bid to started the evening of 2/23    Discussed case with toxicology again will give 1x dose of ativan   UDS + benzos  Placed on  valium prn    Denies any other drug use                   VTE Pharmacologic Prophylaxis: VTE Score: 2 Low Risk (Score 0-2) - Encourage Ambulation.    Mobility:   Basic Mobility Inpatient Raw Score: 17  JH-HLM Goal: 5: Stand one or more mins  JH-HLM Achieved: 2: Bed activities/Dependent transfer  HLM Goal NOT achieved. Continue with multidisciplinary rounding and encourage appropriate mobility to improve upon HLM goals.    Patient Centered Rounds: I performed bedside rounds with nursing staff today.   Discussions with Specialists or Other Care Team Provider: podiatry, CM, PT, Ot    Education and Discussions with Family / Patient: Patient declined call to .     Total Time Spent on Date of Encounter in care of patient: 52 mins. This time was spent on one or more of the following: performing physical exam; counseling and coordination of care; obtaining or reviewing history; documenting in the medical record; reviewing/ordering tests, medications or procedures; communicating with other healthcare professionals and discussing with patient's family/caregivers.    Current Length of Stay: 2 day(s)  Current Patient Status: Inpatient   Certification Statement: The patient will continue to require additional inpatient hospital stay due to sepsis  Discharge Plan: Anticipate discharge in >72 hrs to long term.    Code Status: Level 1 - Full Code    Subjective:   Eh was seen and examined at bedside.  Overnight patient developed a fever of 100.3 and 0423 and still meeting sepsis criteria.  Patient also had a reaction to vancomycin which was discontinued and Benadryl given.  Discussed plan of care.  All questions and concerns were answered and addressed.  Patient continues to look clinically unwell.  CT chest abdomen pelvis ordered also ordered echo.  As well as C. difficile.  Will defer adding linezolid at this time unless fever increases.  May consult ID later today if patient remains unwell and CT imaging within  normal limits.    Objective:     Vitals:   Temp (24hrs), Av °F (37.2 °C), Min:97.9 °F (36.6 °C), Max:100.7 °F (38.2 °C)    Temp:  [97.9 °F (36.6 °C)-100.7 °F (38.2 °C)] 98.3 °F (36.8 °C)  HR:  [] 84  Resp:  [20-47] 21  BP: (130-183)/() 147/90  SpO2:  [95 %-100 %] 96 %  Body mass index is 24.41 kg/m².     Input and Output Summary (last 24 hours):     Intake/Output Summary (Last 24 hours) at 2024 1112  Last data filed at 2024 0653  Gross per 24 hour   Intake 3521.04 ml   Output 1575 ml   Net 1946.04 ml       Physical Exam:   Physical Exam   Vitals and nursing note reviewed.   Constitutional:       General: He is not in acute distress.     Appearance: He is ill-appearing and diaphoretic.      Comments: shivering   HENT:      Head: Normocephalic and atraumatic.   Cardiovascular:      Rate and Rhythm: Normal rate and regular rhythm.      Pulses: Normal pulses.      Heart sounds: Normal heart sounds.   Pulmonary:      Effort: Pulmonary effort is normal.      Breath sounds: Normal breath sounds.   Abdominal:      General: Abdomen is flat. Bowel sounds are normal.      Palpations: Abdomen is soft.   Musculoskeletal:      Right lower leg: No edema.      Left lower leg: No edema.   Skin:     General: Skin is warm.   Neurological:      General: No focal deficit present.      Mental Status: He is alert and oriented to person, place, and time.      Additional Data:     Labs:  Results from last 7 days   Lab Units 24  0234   WBC Thousand/uL 12.24*   HEMOGLOBIN g/dL 12.6   HEMATOCRIT % 40.9   PLATELETS Thousands/uL 368   NEUTROS PCT % 65   LYMPHS PCT % 25   MONOS PCT % 9   EOS PCT % 0     Results from last 7 days   Lab Units 24  0234   SODIUM mmol/L 141   POTASSIUM mmol/L 3.4*   CHLORIDE mmol/L 104   CO2 mmol/L 27   BUN mg/dL 14   CREATININE mg/dL 0.65   ANION GAP mmol/L 10   CALCIUM mg/dL 9.0   ALBUMIN g/dL 3.8   TOTAL BILIRUBIN mg/dL 0.61   ALK PHOS U/L 81   ALT U/L 16   AST U/L 15   GLUCOSE  RANDOM mg/dL 94                 Results from last 7 days   Lab Units 02/25/24  0234 02/24/24  0352 02/23/24  1956 02/23/24  1336   LACTIC ACID mmol/L  --   --  0.9 2.1*   PROCALCITONIN ng/ml <0.05 <0.05  --   --        Lines/Drains:  Invasive Devices       Peripheral Intravenous Line  Duration             Peripheral IV 02/23/24 Left Antecubital 1 day    Peripheral IV 02/25/24 Left;Ventral (anterior) Forearm <1 day                      Telemetry:  Telemetry Orders (From admission, onward)               24 Hour Telemetry Monitoring  Continuous x 24 Hours (Telem)        Question:  Reason for 24 Hour Telemetry  Answer:  Drug overdose known to cause cardiac arrhythmias (e.g. - Cocaine, TCA, Amphetamines, Phenothiazines, Digoxin, etc.) Meds known to need cardiac monitoring: Amiodarone, Dopamine, Dobutamine, Phenytoin                                  Imaging: Personally reviewed the following imaging: MRI right foot    Recent Cultures (last 7 days):   Results from last 7 days   Lab Units 02/23/24  1432 02/23/24  1336   BLOOD CULTURE   --  No Growth at 24 hrs.  No Growth at 24 hrs.   URINE CULTURE  No Growth <1000 cfu/mL  --        Last 24 Hours Medication List:   Current Facility-Administered Medications   Medication Dose Route Frequency Provider Last Rate    aluminum-magnesium hydroxide-simethicone  30 mL Oral Q6H PRN Amelia Manzano MD      buprenorphine-naloxone  8 mg Sublingual BID Amelia Manzano MD      cefepime  2,000 mg Intravenous Q8H Amelia Manzano MD Stopped (02/25/24 0600)    diazepam  5 mg Oral Q6H PRN Amelia Manzano MD      ibuprofen  400 mg Oral Q6H PRN Amelia Manzano MD      ketorolac  15 mg Intravenous Q6H PRN Amelia Manzano MD      melatonin  6 mg Oral HS Nery Wayne PA-C      multi-electrolyte  75 mL/hr Intravenous Continuous Amelia Manzano MD 75 mL/hr (02/25/24 0653)    ondansetron  4 mg Intravenous Q4H PRN Amelia Manzano MD      polyethylene glycol  17 g Oral Daily PRN Amelia Manzano MD       potassium chloride  40 mEq Oral BID Amelia Manzano MD      sodium chloride  1,000 mL Intravenous Once Amelia Manzano MD 1,000 mL/hr at 02/23/24 2354    trimethobenzamide  200 mg Intramuscular Q6H PRN Bhavana Reed PA-C          Today, Patient Was Seen By: Amelia Manzano MD    **Please Note: This note may have been constructed using a voice recognition system.**

## 2024-02-25 NOTE — UTILIZATION REVIEW
"Initial Clinical Review    Admission: Date/Time/Statement:   Admission Orders (From admission, onward)       Ordered        02/23/24 1601  INPATIENT ADMISSION  Once                          Orders Placed This Encounter   Procedures    INPATIENT ADMISSION     Standing Status:   Standing     Number of Occurrences:   1     Order Specific Question:   Level of Care     Answer:   Med Surg [16]     Order Specific Question:   Estimated length of stay     Answer:   More than 2 Midnights     Order Specific Question:   Certification     Answer:   I certify that inpatient services are medically necessary for this patient for a duration of greater than two midnights. See H&P and MD Progress Notes for additional information about the patient's course of treatment.     ED Arrival Information       Expected   -    Arrival   2/23/2024 11:38    Acuity   Urgent              Means of arrival   Walk-In    Escorted by   Self    Service   Hospitalist    Admission type   Emergency              Arrival complaint   Foot infection             Chief Complaint   Patient presents with    Wound Check     Pt reports fracturing his right heel and then got an infection in the bone. Started oral abx today. Came to er to \"get it checked out\". Reports he did not get it checked before being placed on abx, and the abx came from [facility] rehab.        Initial Presentation: 33 y.o. male to the ED from home with complaints of right heel wound.  Admitted to inpatient for osteomyelitis right foot.  H/O HTN and opioid abuse currently detoxing at  rehab.  Fractured heel 1/2/24, currently in walking boot.  Seen at another facility the day prior due to increase in redness, started on abx, advised to have MRI done, but did not get it done.  It is now very painful to walk.  Arrives with tremors, shortness of breath. Erythema, edema of right foot. GCs 15, flat affect. On arrival, wbc 17.58, CRp 10, ESR 69.  Blood cultures pending, started on IV abx.  Check " MRi.  Give Toradol for severe pain. He is diaphoretic. Podiatry and med/tox consult.  Med/tox: Has had dosing of buprenorphine already, nothing further to do for withdrawal symptoms. Continue buprenorphine.     Podiatry: Patient with white blood cell count of 17.58, elevated lactic acid, sed rate, CRP. Need to rule out calcaneal infection given suspected lucency noted posterior aspect of calcaneus suspicious for infection versus residual fracture. Osteopenia on CT scan yet has been weightbearing in cam boot. Continue with IV abx.  WBAT.     Date: 2/24   Day 2:   Blood cultures pending. MRi does not show osteo currently. Continue with IV abx.  No surgical intervention needed at this time. Right foot, no erythema is present, foot is no longer warm to touch, patient is diaphoretic to bilateral lower extremities, there is no pain on palpation of calcaneus, no pain on range of motion of ankle and subtalar joint. Check ECHO.     2/24 UPDate:  after starting vanco, became flushed, red, patchy rash on face, chest, shoulders.  Hold Vanco and dc future doses.  Continue with cefepime for possible UTi.  Lungs clear. No difficulty  breathing.     ED Triage Vitals   Temperature Pulse Respirations Blood Pressure SpO2   02/23/24 1154 02/23/24 1154 02/23/24 1152 02/23/24 1154 02/23/24 1154   98.8 °F (37.1 °C) 86 18 139/96 97 %      Temp Source Heart Rate Source Patient Position - Orthostatic VS BP Location FiO2 (%)   02/23/24 1152 02/23/24 1152 02/23/24 1152 02/23/24 1152 --   Temporal Monitor Sitting Right arm       Pain Score       02/23/24 2106       4          Wt Readings from Last 1 Encounters:   02/23/24 79.4 kg (175 lb)     Additional Vital Signs: Vital Signs (last 2 days)    Date/Time Temp Pulse Resp BP MAP (mmHg) SpO2 O2 Device Patient Position - Orthostatic VS   02/24/24 2300 -- 83 20 152/79 109 96 % -- --   02/24/24 2200 -- 86 21 146/86 110 99 % -- --   02/24/24 2100 -- 89 24 Abnormal  138/77 103 99 % -- --   02/24/24  2000 -- 79 21 145/80 106 99 % -- --   02/24/24 1900 98.1 °F (36.7 °C) 85 20 139/79 102 100 % None (Room air) Lying   02/24/24 1616 97.9 °F (36.6 °C) 95 24 Abnormal  145/81 108 100 % -- --   02/24/24 1600 -- 81 24 Abnormal  183/106 Abnormal  138 100 % -- --   02/24/24 1500 -- 79 34 Abnormal  154/100 122 98 % -- --   02/24/24 1400 -- 76 42 Abnormal  138/91 111 99 % -- --   02/24/24 1300 -- 84 47 Abnormal  140/84 108 99 % -- --   02/24/24 1257 98.1 °F (36.7 °C) 86 33 Abnormal  138/90 109 99 % None (Room air) Lying   02/24/24 1130 100.7 °F (38.2 °C) Abnormal  -- -- -- -- -- -- --   02/24/24 07:55:23 97.8 °F (36.6 °C) 87 22 130/80 97 98 % -- --   02/24/24 0526 -- 95 -- -- -- -- -- --   02/23/24 23:06:18 97.2 °F (36.2 °C) Abnormal  74 -- -- -- 97 % -- --   02/23/24 21:47:32 97.2 °F (36.2 °C) Abnormal  84 -- -- -- 97 % -- --   02/23/24 21:09:27 97.9 °F (36.6 °C) 79 21 150/98 115 98 % -- --   02/23/24 20:06:45 98.8 °F (37.1 °C) 79 -- -- -- 98 % -- --   02/23/24 17:56:51 98.8 °F (37.1 °C) 79 22 139/82 101 99 % -- --   02/23/24 1615 -- 80 18 134/79 101 100 % -- --   02/23/24 1430 -- 71 18 137/74 99 99 % -- --   02/23/24 1400 -- 68 18 136/80 102 99 % -- --   02/23/24 1331 -- -- -- -- -- -- None (Room air) --   02/23/24 1330 -- 66 18 134/90 108 98 % -- --   02/23/24 1154 98.8 °F (37.1 °C) 86 -- 139/96 -- 97 % None (Room air) --   02/23/24 1152 -- -- 18 -- -- -- None (Room air) Sitting     Pertinent Labs/Diagnostic Test Results:   MRI ankle/heel right w wo contrast   Final Result by Antoni Vasquez MD (02/24 0005)      Expected appearance of healing calcaneal fractures. While there is no evidence of osteomyelitis, please note that the sensitivity is significantly diminished given presence of fractures. If further imaging is desired, a technetium-sulfur colloid bone    scan, along with an indium tagged white blood cell scan may be helpful in this scenario.         Workstation performed: [id number]         CT lower extremity wo  contrast right   Final Result by Cristian Delgado MD (02/23 1507)      Extensive comminuted calcaneus fracture as above. Mild diffuse osteopenia likely related to disuse.         Workstation performed: [id number]         XR chest 1 view portable   ED Interpretation by Melissa Jett PA-C (02/23 1446)   No acute abnormalities.       Final Result by Torsten Ambriz MD (02/23 1626)      No acute cardiopulmonary disease.            Workstation performed: [id number]                     Results from last 7 days   Lab Units 02/25/24  0234 02/24/24  0352 02/23/24  1322   WBC Thousand/uL 12.24* 13.14* 17.58*   HEMOGLOBIN g/dL 12.6 12.1 14.9   HEMATOCRIT % 40.9 39.3 46.7   PLATELETS Thousands/uL 368 307 593*   NEUTROS ABS Thousands/µL 7.89* 9.33* 13.56*         Results from last 7 days   Lab Units 02/25/24  0234 02/24/24  0352 02/23/24  1322   SODIUM mmol/L 141 140 141   POTASSIUM mmol/L 3.4* 3.8 3.6   CHLORIDE mmol/L 104 108 102   CO2 mmol/L 27 23 27   ANION GAP mmol/L 10 9 12   BUN mg/dL 14 16 15   CREATININE mg/dL 0.65 0.61 0.81   EGFR ml/min/1.73sq m 129 133 118   CALCIUM mg/dL 9.0 9.1 10.4*   MAGNESIUM mg/dL 2.3 2.1  --      Results from last 7 days   Lab Units 02/25/24  0234 02/24/24  0352 02/23/24  1322   AST U/L 15 12* 14   ALT U/L 16 18 26   ALK PHOS U/L 81 83 117*   TOTAL PROTEIN g/dL 6.9 6.9 9.1*   ALBUMIN g/dL 3.8 3.7 4.8   TOTAL BILIRUBIN mg/dL 0.61 0.56 0.48   BILIRUBIN DIRECT mg/dL 0.09  --   --          Results from last 7 days   Lab Units 02/25/24  0234 02/24/24  0352 02/23/24  1322   GLUCOSE RANDOM mg/dL 94 96 114           Results from last 7 days   Lab Units 02/25/24  0234 02/24/24  0352   PROCALCITONIN ng/ml <0.05 <0.05     Results from last 7 days   Lab Units 02/23/24  1956 02/23/24  1336   LACTIC ACID mmol/L 0.9 2.1*       Results from last 7 days   Lab Units 02/25/24  0234   LIPASE u/L 29     Results from last 7 days   Lab Units 02/23/24  1322   CRP mg/L 10.9*   SED RATE mm/hour 69*      Results from last 7 days   Lab Units 02/23/24  1432   CLARITY UA  Clear   COLOR UA  Yellow   SPEC GRAV UA  >=1.030   PH UA  6.0   GLUCOSE UA mg/dl 30 (3/100%)*   KETONES UA mg/dl 10 (1+)*   BLOOD UA  Negative   PROTEIN UA mg/dl 30 (1+)*   NITRITE UA  Negative   BILIRUBIN UA  Negative   UROBILINOGEN UA (BE) mg/dl <2.0   LEUKOCYTES UA  Negative   WBC UA /hpf 0-1   RBC UA /hpf 0-1   BACTERIA UA /hpf Innumerable*   EPITHELIAL CELLS WET PREP /hpf Occasional             Results from last 7 days   Lab Units 02/23/24  1432   AMPH/METH  Negative   BARBITURATE UR  Negative   BENZODIAZEPINE UR  Positive*   COCAINE UR  Negative   METHADONE URINE  Negative   OPIATE UR  Negative   PCP UR  Negative   THC UR  Negative       Results from last 7 days   Lab Units 02/23/24  1432 02/23/24  1336   BLOOD CULTURE   --  No Growth at 24 hrs.  No Growth at 24 hrs.   URINE CULTURE  No Growth <1000 cfu/mL  --              Results from last 7 days   Lab Units 02/25/24  0235   VANCOMYCIN RM ug/mL 8.2*       ED Treatment:   Medication Administration from 02/23/2024 1137 to 02/23/2024 1752         Date/Time Order Dose Route Action Comments     02/23/2024 1418 EST ondansetron (ZOFRAN) injection 4 mg 4 mg Intravenous Given --     02/23/2024 1435 EST sodium chloride 0.9 % bolus 1,000 mL 1,000 mL Intravenous New Bag --     02/23/2024 1625 EST cefepime (MAXIPIME) IVPB (premix in dextrose) 2,000 mg 50 mL 2,000 mg Intravenous New Bag --          Past Medical History:   Diagnosis Date    Hypertension      Present on Admission:  **None**      Admitting Diagnosis: Opiate withdrawal (HCC) [F11.93]  Right foot pain [M79.671]  Osteomyelitis of right foot (HCC) [M86.9]  Age/Sex: 33 y.o. male  Admission Orders:  Scheduled Medications:  buprenorphine-naloxone, 8 mg, Sublingual, BID  cefepime, 2,000 mg, Intravenous, Q8H  melatonin, 6 mg, Oral, HS  sodium chloride, 1,000 mL, Intravenous, Once      Continuous IV Infusions:  multi-electrolyte, 75 mL/hr,  Intravenous, Continuous      PRN Meds:  aluminum-magnesium hydroxide-simethicone, 30 mL, Oral, Q6H PRN  diazepam, 5 mg, Oral, Q6H PRN  ibuprofen, 400 mg, Oral, Q6H PRN  ketorolac, 15 mg, Intravenous, Q6H PRN  ondansetron, 4 mg, Intravenous, Q4H PRN  polyethylene glycol, 17 g, Oral, Daily PRN  trimethobenzamide, 200 mg, Intramuscular, Q6H PRN        IP CONSULT TO PODIATRY  IP CONSULT TO TOXICOLOGY  IP CONSULT TO CASE MANAGEMENT    Network Utilization Review Department  ATTENTION: Please call with any questions or concerns to [phone] and carefully listen to the prompts so that you are directed to the right person. All voicemails are confidential.   For Discharge needs, contact Care Management DC Support Team at [phone] opt. [phone]  Send all requests for admission clinical reviews, approved or denied determinations and any other requests to dedicated fax number below belonging to the campus where the patient is receiving treatment. List of dedicated fax numbers for the Facilities:  FACILITY NAME UR FAX NUMBER   ADMISSION DENIALS (Administrative/Medical Necessity) [phone]   DISCHARGE SUPPORT TEAM (NETWORK) [phone]   PARENT CHILD HEALTH (Maternity/NICU/Pediatrics) [phone]   General acute hospital [phone]   [facility] [phone]   [facility] [phone]   [facility] [phone]   [facility] [phone]   [facility] [phone]   [facility] [phone]   [facility] [phone]   [facility] [phone]   [facility] [phone]   [facility] [phone]   [facility] [phone]

## 2024-02-26 ENCOUNTER — APPOINTMENT (INPATIENT)
Dept: NON INVASIVE DIAGNOSTICS | Facility: HOSPITAL | Age: 34
DRG: 720 | End: 2024-02-26
Payer: COMMERCIAL

## 2024-02-26 LAB
ALBUMIN SERPL BCP-MCNC: 3.9 G/DL (ref 3.5–5)
ALP SERPL-CCNC: 75 U/L (ref 34–104)
ALT SERPL W P-5'-P-CCNC: 13 U/L (ref 7–52)
ANION GAP SERPL CALCULATED.3IONS-SCNC: 8 MMOL/L
AORTIC ROOT: 3.8 CM
APICAL FOUR CHAMBER EJECTION FRACTION: 64 %
AST SERPL W P-5'-P-CCNC: 13 U/L (ref 13–39)
B PARAP IS1001 DNA NPH QL NAA+NON-PROBE: NOT DETECTED
B PERT.PT PRMT NPH QL NAA+NON-PROBE: NOT DETECTED
BASOPHILS # BLD AUTO: 0.06 THOUSANDS/ÂΜL (ref 0–0.1)
BASOPHILS NFR BLD AUTO: 1 % (ref 0–1)
BILIRUB SERPL-MCNC: 0.62 MG/DL (ref 0.2–1)
BSA FOR ECHO PROCEDURE: 1.99 M2
BUN SERPL-MCNC: 9 MG/DL (ref 5–25)
C DIFF TOX GENS STL QL NAA+PROBE: NEGATIVE
C PNEUM DNA NPH QL NAA+NON-PROBE: NOT DETECTED
CALCIUM SERPL-MCNC: 9 MG/DL (ref 8.4–10.2)
CHLORIDE SERPL-SCNC: 105 MMOL/L (ref 96–108)
CO2 SERPL-SCNC: 26 MMOL/L (ref 21–32)
CREAT SERPL-MCNC: 0.57 MG/DL (ref 0.6–1.3)
E WAVE DECELERATION TIME: 186 MS
E/A RATIO: 1.33
EOSINOPHIL # BLD AUTO: 0.06 THOUSAND/ÂΜL (ref 0–0.61)
EOSINOPHIL NFR BLD AUTO: 1 % (ref 0–6)
ERYTHROCYTE [DISTWIDTH] IN BLOOD BY AUTOMATED COUNT: 12.7 % (ref 11.6–15.1)
FLUAV RNA NPH QL NAA+NON-PROBE: NOT DETECTED
FLUBV RNA NPH QL NAA+NON-PROBE: NOT DETECTED
FRACTIONAL SHORTENING: 32 (ref 28–44)
GFR SERPL CREATININE-BSD FRML MDRD: 136 ML/MIN/1.73SQ M
GLUCOSE SERPL-MCNC: 101 MG/DL (ref 65–140)
HADV DNA NPH QL NAA+NON-PROBE: NOT DETECTED
HCOV 229E RNA NPH QL NAA+NON-PROBE: NOT DETECTED
HCOV HKU1 RNA NPH QL NAA+NON-PROBE: NOT DETECTED
HCOV NL63 RNA NPH QL NAA+NON-PROBE: NOT DETECTED
HCOV OC43 RNA NPH QL NAA+NON-PROBE: NOT DETECTED
HCT VFR BLD AUTO: 42 % (ref 36.5–49.3)
HGB BLD-MCNC: 13.3 G/DL (ref 12–17)
HMPV RNA NPH QL NAA+NON-PROBE: NOT DETECTED
HPIV1 RNA NPH QL NAA+NON-PROBE: NOT DETECTED
HPIV2 RNA NPH QL NAA+NON-PROBE: NOT DETECTED
HPIV3 RNA NPH QL NAA+NON-PROBE: NOT DETECTED
HPIV4 RNA NPH QL NAA+NON-PROBE: NOT DETECTED
IMM GRANULOCYTES # BLD AUTO: 0.15 THOUSAND/UL (ref 0–0.2)
IMM GRANULOCYTES NFR BLD AUTO: 2 % (ref 0–2)
INTERVENTRICULAR SEPTUM IN DIASTOLE (PARASTERNAL SHORT AXIS VIEW): 0.7 CM
INTERVENTRICULAR SEPTUM: 0.7 CM (ref 0.6–1.1)
L PNEUMO1 AG UR QL IA.RAPID: NEGATIVE
LAAS-AP2: 14 CM2
LAAS-AP4: 14.6 CM2
LEFT ATRIUM SIZE: 4.2 CM
LEFT ATRIUM VOLUME (MOD BIPLANE): 41 ML
LEFT ATRIUM VOLUME INDEX (MOD BIPLANE): 20.6 ML/M2
LEFT INTERNAL DIMENSION IN SYSTOLE: 4.2 CM (ref 2.1–4)
LEFT VENTRICLE DIASTOLIC VOLUME (MOD BIPLANE): 224 ML
LEFT VENTRICLE DIASTOLIC VOLUME INDEX (MOD BIPLANE): 112.6 ML/M2
LEFT VENTRICLE SYSTOLIC VOLUME (MOD BIPLANE): 80 ML
LEFT VENTRICLE SYSTOLIC VOLUME INDEX (MOD BIPLANE): 40.2 ML/M2
LEFT VENTRICULAR INTERNAL DIMENSION IN DIASTOLE: 6.2 CM (ref 3.5–6)
LEFT VENTRICULAR POSTERIOR WALL IN END DIASTOLE: 0.8 CM
LEFT VENTRICULAR STROKE VOLUME: 114 ML
LV EF: 64 %
LVSV (TEICH): 114 ML
LYMPHOCYTES # BLD AUTO: 2.94 THOUSANDS/ÂΜL (ref 0.6–4.47)
LYMPHOCYTES NFR BLD AUTO: 31 % (ref 14–44)
M PNEUMO DNA NPH QL NAA+NON-PROBE: NOT DETECTED
MCH RBC QN AUTO: 27.9 PG (ref 26.8–34.3)
MCHC RBC AUTO-ENTMCNC: 31.7 G/DL (ref 31.4–37.4)
MCV RBC AUTO: 88 FL (ref 82–98)
MONOCYTES # BLD AUTO: 1.01 THOUSAND/ÂΜL (ref 0.17–1.22)
MONOCYTES NFR BLD AUTO: 11 % (ref 4–12)
MV E'TISSUE VEL-LAT: 21 CM/S
MV E'TISSUE VEL-SEP: 18 CM/S
MV PEAK A VEL: 0.6 M/S
MV PEAK E VEL: 80 CM/S
MV STENOSIS PRESSURE HALF TIME: 54 MS
MV VALVE AREA P 1/2 METHOD: 4.07
NEUTROPHILS # BLD AUTO: 5.23 THOUSANDS/ÂΜL (ref 1.85–7.62)
NEUTS SEG NFR BLD AUTO: 54 % (ref 43–75)
NRBC BLD AUTO-RTO: 0 /100 WBCS
PLATELET # BLD AUTO: 362 THOUSANDS/UL (ref 149–390)
PMV BLD AUTO: 9 FL (ref 8.9–12.7)
POTASSIUM SERPL-SCNC: 3.6 MMOL/L (ref 3.5–5.3)
PROCALCITONIN SERPL-MCNC: 0.06 NG/ML
PROT SERPL-MCNC: 6.8 G/DL (ref 6.4–8.4)
RA PRESSURE ESTIMATED: 3 MMHG
RBC # BLD AUTO: 4.77 MILLION/UL (ref 3.88–5.62)
RIGHT ATRIUM AREA SYSTOLE A4C: 13.7 CM2
RIGHT VENTRICLE ID DIMENSION: 3.2 CM
RSV RNA NPH QL NAA+NON-PROBE: NOT DETECTED
RV+EV RNA NPH QL NAA+NON-PROBE: NOT DETECTED
S PNEUM AG UR QL: NEGATIVE
SARS-COV-2 RNA NPH QL NAA+NON-PROBE: NOT DETECTED
SL CV LEFT ATRIUM LENGTH A2C: 3.9 CM
SL CV LV EF: 55
SL CV PED ECHO LEFT VENTRICLE DIASTOLIC VOLUME (MOD BIPLANE) 2D: 194 ML
SL CV PED ECHO LEFT VENTRICLE SYSTOLIC VOLUME (MOD BIPLANE) 2D: 80 ML
SODIUM SERPL-SCNC: 139 MMOL/L (ref 135–147)
TRICUSPID ANNULAR PLANE SYSTOLIC EXCURSION: 2.7 CM
WBC # BLD AUTO: 9.45 THOUSAND/UL (ref 4.31–10.16)

## 2024-02-26 PROCEDURE — 93306 TTE W/DOPPLER COMPLETE: CPT

## 2024-02-26 PROCEDURE — 80053 COMPREHEN METABOLIC PANEL: CPT | Performed by: STUDENT IN AN ORGANIZED HEALTH CARE EDUCATION/TRAINING PROGRAM

## 2024-02-26 PROCEDURE — 85025 COMPLETE CBC W/AUTO DIFF WBC: CPT

## 2024-02-26 PROCEDURE — 99232 SBSQ HOSP IP/OBS MODERATE 35: CPT | Performed by: INTERNAL MEDICINE

## 2024-02-26 PROCEDURE — 93306 TTE W/DOPPLER COMPLETE: CPT | Performed by: INTERNAL MEDICINE

## 2024-02-26 PROCEDURE — 84145 PROCALCITONIN (PCT): CPT | Performed by: STUDENT IN AN ORGANIZED HEALTH CARE EDUCATION/TRAINING PROGRAM

## 2024-02-26 RX ORDER — LORAZEPAM 2 MG/ML
1 INJECTION INTRAMUSCULAR ONCE
Status: COMPLETED | OUTPATIENT
Start: 2024-02-26 | End: 2024-02-26

## 2024-02-26 RX ORDER — SODIUM CHLORIDE 9 MG/ML
75 INJECTION, SOLUTION INTRAVENOUS CONTINUOUS
Status: DISCONTINUED | OUTPATIENT
Start: 2024-02-26 | End: 2024-03-01

## 2024-02-26 RX ADMIN — CEFEPIME HYDROCHLORIDE 2000 MG: 2 INJECTION, SOLUTION INTRAVENOUS at 06:32

## 2024-02-26 RX ADMIN — POTASSIUM CHLORIDE 20 MEQ: 14.9 INJECTION, SOLUTION INTRAVENOUS at 21:15

## 2024-02-26 RX ADMIN — GUAIFENESIN 600 MG: 600 TABLET ORAL at 21:22

## 2024-02-26 RX ADMIN — KETOROLAC TROMETHAMINE 15 MG: 30 INJECTION, SOLUTION INTRAMUSCULAR; INTRAVENOUS at 08:24

## 2024-02-26 RX ADMIN — BENZONATATE 100 MG: 100 CAPSULE ORAL at 13:13

## 2024-02-26 RX ADMIN — KETOROLAC TROMETHAMINE 15 MG: 30 INJECTION, SOLUTION INTRAMUSCULAR; INTRAVENOUS at 13:12

## 2024-02-26 RX ADMIN — BUPRENORPHINE AND NALOXONE 8 MG: 8; 2 FILM BUCCAL; SUBLINGUAL at 08:27

## 2024-02-26 RX ADMIN — GUAIFENESIN 600 MG: 600 TABLET ORAL at 08:26

## 2024-02-26 RX ADMIN — POTASSIUM CHLORIDE 20 MEQ: 14.9 INJECTION, SOLUTION INTRAVENOUS at 15:12

## 2024-02-26 RX ADMIN — Medication 6 MG: at 21:22

## 2024-02-26 RX ADMIN — BUPRENORPHINE AND NALOXONE 8 MG: 8; 2 FILM BUCCAL; SUBLINGUAL at 18:07

## 2024-02-26 RX ADMIN — SODIUM CHLORIDE, SODIUM GLUCONATE, SODIUM ACETATE, POTASSIUM CHLORIDE, MAGNESIUM CHLORIDE, SODIUM PHOSPHATE, DIBASIC, AND POTASSIUM PHOSPHATE 75 ML/HR: .53; .5; .37; .037; .03; .012; .00082 INJECTION, SOLUTION INTRAVENOUS at 10:17

## 2024-02-26 RX ADMIN — SODIUM CHLORIDE 100 ML/HR: 0.9 INJECTION, SOLUTION INTRAVENOUS at 14:18

## 2024-02-26 RX ADMIN — LORAZEPAM 1 MG: 2 INJECTION INTRAMUSCULAR; INTRAVENOUS at 15:12

## 2024-02-26 RX ADMIN — CEFEPIME HYDROCHLORIDE 2000 MG: 2 INJECTION, SOLUTION INTRAVENOUS at 14:17

## 2024-02-26 RX ADMIN — BENZONATATE 100 MG: 100 CAPSULE ORAL at 08:26

## 2024-02-26 RX ADMIN — DIAZEPAM 5 MG: 5 TABLET ORAL at 13:13

## 2024-02-26 RX ADMIN — AZITHROMYCIN MONOHYDRATE 500 MG: 500 INJECTION, POWDER, LYOPHILIZED, FOR SOLUTION INTRAVENOUS at 13:08

## 2024-02-26 RX ADMIN — DIAZEPAM 5 MG: 5 TABLET ORAL at 08:26

## 2024-02-26 RX ADMIN — DIAZEPAM 5 MG: 5 TABLET ORAL at 21:04

## 2024-02-26 RX ADMIN — CEFEPIME HYDROCHLORIDE 2000 MG: 2 INJECTION, SOLUTION INTRAVENOUS at 21:17

## 2024-02-26 RX ADMIN — POTASSIUM CHLORIDE 20 MEQ: 14.9 INJECTION, SOLUTION INTRAVENOUS at 08:29

## 2024-02-26 NOTE — PROGRESS NOTES
[facility]  Progress Note  Name: Eh Chauhan I  MRN: [id number]  Unit/Bed#: -01 SDU I Date of Admission: 2/23/2024   Date of Service: 2/26/2024 I Hospital Day: 3    Assessment/Plan   Fracture of right calcaneus  Assessment & Plan  See mgx above    Abnormal urinalysis  Assessment & Plan  UA showing innumerable bacteria   UC showing no growth    Sepsis without acute organ dysfunction (HCC)  Assessment & Plan  See management above    Opioid use disorder  Assessment & Plan  Comes from [facility] for where he has been undergoing opioid detox  Discussed with toxicology who was consulted and recommended:  Continue suboxone 8 mg bid to started the evening of 2/23    Discussed case with toxicology again will give 1x dose of ativan   UDS + benzos  Placed on valium prn    Denies any other drug use        * Sepsis due to pneumonia (HCC)  Assessment & Plan  Pt presenting d/t worsening right LE pain and possible infection    Initially presented on 1/3/24 where he was found to have a calcaneal fracture after jumping over a fence near [facility] for which he was to follow-up with Ortho however was unable to follow-up    On 2/21/2024 had further pain and was told that there was a possible infection and placed on antibiotics. Pt failed abx and was told to come to the ED for further evaluation    Pt meeting sepsis criteria d/t tachypnea and leukocytosis of 17.58 with a source being osteomyelitis  Patient was given Vanco and cefepime and 1 L of fluids in the ED  Lactic acid 2.1  ESR 69  CRP 10  Discussed with podiatry for home was consulted and recommended continuation of antibiotics and MRI    blood cultures NGTD   Continue cefepime   added azithromycin  Dc'd vanc d/t possible allergic reaction-> continue to montior off of vanc   MRSA culture pending  MRI showing no osteo at this time  CT c/a/pelvis-- Scattered small groundglass opacities in the right lower greater than upper lobes, suggesting  "atypical infection.2. Mild circumferential urinary bladder wall thickening. Correlate with urinalysis.3. Skin thickening/edema at the base of the penis/upper scrotum, suggesting cellulitis. Recommend direct inspection. Both testes located in the distal inguinal canals, suggesting cryptorchidism.4. Indeterminate 1.3 cm hypodense right hepatic lobe lesion. Outpatient MRI abdomen with and without contrast can be obtained for further evaluation.\"  C diff ordered  Echo ordered  Urine ag ordered  Sputum cx  MRSA cx  Toradol for severe pain  Trend WBC and fever curve           Labs & Imaging: I have personally reviewed pertinent reports.      VTE Prophylaxis: in place.    Code Status:   Level 1 - Full Code    Patient Centered Rounds: I have performed bedside rounds with nursing staff today.    Mobility:   Basic Mobility Inpatient Raw Score: 13  -HLM Goal: 4: Move to chair/commode  JH-HLM Achieved: 4: Move to chair/commode  HLM Goal NOT achieved. Continue with multidisciplinary rounding and encourage appropriate mobility to improve upon HLM goals.    Discussions with Specialists or Other Care Team Provider: RN    Education and Discussions with Family / Patient: Patient declined family update    Total Time Spent on Date of Encounter in care of patient: 35 mins. This time was spent on one or more of the following: performing physical exam; counseling and coordination of care; obtaining or reviewing history; documenting in the medical record; reviewing/ordering tests, medications or procedures; communicating with other healthcare professionals and discussing with patient's family/caregivers.    Current Length of Stay: 3 day(s)    Current Patient Status: Inpatient   Certification Statement: The patient will continue to require additional inpatient hospital stay due to see my assessment and plan.     Subjective:   Patient is seen and examined at bedside.  Patient noted to have tremors and diaphoretic.  Low-grade fever.  Pain " "is well-controlled.  All other ROS are negative.    Objective:    Vitals: Blood pressure 148/88, pulse 91, temperature 99.9 °F (37.7 °C), temperature source Temporal, resp. rate (!) 28, height 5' 11\" (1.803 m), weight 79.4 kg (175 lb), SpO2 97%.,Body mass index is 24.41 kg/m².  SPO2 RA Rest      Flowsheet Row ED to Hosp-Admission (Current) from 2/23/2024 in [facility] Int[facility] Care Unit   SpO2 97 %   SpO2 Activity At Rest   O2 Device None (Room air)   O2 Flow Rate --          I&O:   Intake/Output Summary (Last 24 hours) at 2/26/2024 1054  Last data filed at 2/26/2024 0400  Gross per 24 hour   Intake 3640 ml   Output 1525 ml   Net 2115 ml       Physical Exam:    General- Alert, lying comfortably in bed. Not in any acute distress.  Neck- Supple, No JVD  CVS- regular, S1 and S2 normal  Chest- Bilateral Air entry decreased at bases  Abdomen- soft, nontender, not distended, no guarding or rigidity, BS+  Extremities-  No pedal edema, No calf tenderness                         Normal ROM in all extremities.  CNS-   Alert, awake and orientedx3. No focal deficits present.    Invasive Devices       Peripheral Intravenous Line  Duration             Peripheral IV 02/23/24 Left Antecubital 2 days    Peripheral IV 02/25/24 Left;Ventral (anterior) Forearm 1 day                          Social History  reviewed  History reviewed. No pertinent family history. reviewed    Meds:  Current Facility-Administered Medications   Medication Dose Route Frequency Provider Last Rate Last Admin    aluminum-magnesium hydroxide-simethicone (MAALOX) oral suspension 30 mL  30 mL Oral Q6H PRN Amelia Manzano MD        azithromycin (ZITHROMAX) 500 mg in sodium chloride 0.9% 250mL IVPB 500 mg  500 mg Intravenous Q24H Amelia Manzano MD   Stopped at 02/25/24 1443    benzonatate (TESSALON PERLES) capsule 100 mg  100 mg Oral TID PRN Amelia Manzano MD   100 mg at 02/26/24 0826    buprenorphine-naloxone (Suboxone) film 8 mg  8 mg " Sublingual BID Amelia Manzano MD   8 mg at 02/26/24 0827    cefepime (MAXIPIME) IVPB (premix in dextrose) 2,000 mg 50 mL  2,000 mg Intravenous Q8H Amelia Manzano  mL/hr at 02/26/24 0632 2,000 mg at 02/26/24 0632    diazepam (VALIUM) tablet 5 mg  5 mg Oral Q6H PRN Amelia Manzano MD   5 mg at 02/26/24 0826    guaiFENesin (MUCINEX) 12 hr tablet 600 mg  600 mg Oral Q12H MEGHAN Amelia Manzano MD   600 mg at 02/26/24 0826    ibuprofen (MOTRIN) tablet 400 mg  400 mg Oral Q6H PRN Amelia Manzano MD        ketorolac (TORADOL) injection 15 mg  15 mg Intravenous Q6H PRN Amelia Manzano MD   15 mg at 02/26/24 0824    melatonin tablet 6 mg  6 mg Oral HS Nery Wayne PA-C   6 mg at 02/25/24 2118    metoclopramide (REGLAN) injection 10 mg  10 mg Intravenous Q6H PRN Amelia Manzano MD   10 mg at 02/25/24 1622    multi-electrolyte (PLASMALYTE-A/ISOLYTE-S PH 7.4) IV solution  75 mL/hr Intravenous Continuous Amelia Manzano MD 75 mL/hr at 02/26/24 1017 75 mL/hr at 02/26/24 1017    polyethylene glycol (MIRALAX) packet 17 g  17 g Oral Daily PRN Amelia Manzano MD        potassium chloride 20 mEq IVPB (premix)  20 mEq Intravenous TID Amelia Manzano MD 50 mL/hr at 02/26/24 0829 20 mEq at 02/26/24 0829    sodium chloride 0.9 % bolus 1,000 mL  1,000 mL Intravenous Once Amelia Manzano MD 1,000 mL/hr at 02/23/24 2354 Restarted at 02/23/24 2354    trimethobenzamide (TIGAN) IM injection 200 mg  200 mg Intramuscular Q6H PRN Bhavana Reed PA-C   200 mg at 02/24/24 2354      No medications prior to admission.       Labs:  Results from last 7 days   Lab Units 02/26/24  0632 02/25/24  0234 02/24/24  0352   WBC Thousand/uL 9.45 12.24* 13.14*   HEMOGLOBIN g/dL 13.3 12.6 12.1   HEMATOCRIT % 42.0 40.9 39.3   PLATELETS Thousands/uL 362 368 307   NEUTROS PCT % 54 65 72   LYMPHS PCT % 31 25 19   MONOS PCT % 11 9 8   EOS PCT % 1 0 0     Results from last 7 days   Lab Units 02/26/24  0632 02/25/24 2126 02/25/24 0234 02/24/24  0352  "  POTASSIUM mmol/L 3.6 3.5 3.4* 3.8   CHLORIDE mmol/L 105 105 104 108   CO2 mmol/L 26 26 27 23   BUN mg/dL 9 10 14 16   CREATININE mg/dL 0.57* 0.60 0.65 0.61   CALCIUM mg/dL 9.0 8.9 9.0 9.1   ALK PHOS U/L 75  --  81 83   ALT U/L 13  --  16 18   AST U/L 13  --  15 12*     No results found for: \"TROPONINI\", \"CKMB\", \"CKTOTAL\"      Lab Results   Component Value Date    BLOODCX No Growth at 48 hrs. 02/23/2024    BLOODCX No Growth at 48 hrs. 02/23/2024    URINECX No Growth <1000 cfu/mL 02/23/2024         Imaging:  Results for orders placed during the hospital encounter of 02/23/24    XR chest 1 view portable    Narrative  XR CHEST PORTABLE    INDICATION: fever. Shortness of breath.    COMPARISON: None    FINDINGS:    Clear lungs. No pneumothorax or pleural effusion.    Normal cardiomediastinal silhouette.    Bones are unremarkable for age.    Normal upper abdomen.    Impression  No acute cardiopulmonary disease.        Workstation performed: [id number]    No results found for this or any previous visit.      Last 24 Hours Medication List:   Current Facility-Administered Medications   Medication Dose Route Frequency Provider Last Rate    aluminum-magnesium hydroxide-simethicone  30 mL Oral Q6H PRN Amelia Manzano MD      azithromycin  500 mg Intravenous Q24H Amelia Manzano MD Stopped (02/25/24 1443)    benzonatate  100 mg Oral TID PRN Amelia Manzano MD      buprenorphine-naloxone  8 mg Sublingual BID Amelia Manzano MD      cefepime  2,000 mg Intravenous Q8H Amelia Manzano MD 2,000 mg (02/26/24 0632)    diazepam  5 mg Oral Q6H PRN Amelia Manzano MD      guaiFENesin  600 mg Oral Q12H MEGHAN Amelia Manzano MD      ibuprofen  400 mg Oral Q6H PRN Amelia Manzano MD      ketorolac  15 mg Intravenous Q6H PRN Amelia Manzano MD      melatonin  6 mg Oral HS Nery Wayne PA-C      metoclopramide  10 mg Intravenous Q6H PRN Amelia Manzano MD      multi-electrolyte  75 mL/hr Intravenous Continuous Amelia Manzano MD 75 mL/hr " (02/26/24 1017)    polyethylene glycol  17 g Oral Daily PRN Amelia Manzano MD      potassium chloride  20 mEq Intravenous TID Amelia Manzano MD 20 mEq (02/26/24 0829)    sodium chloride  1,000 mL Intravenous Once Amelia Manzano MD 1,000 mL/hr at 02/23/24 8224    trimethobenzamide  200 mg Intramuscular Q6H PRN Bhavana Reed PA-C          Today, Patient Was Seen By: Kuldeep Sanchez MD    ** Please Note: Dictation voice to text software may have been used in the creation of this document. **

## 2024-02-26 NOTE — OCCUPATIONAL THERAPY NOTE
Occupational Therapy Cancellation Note    Patient Name: Eh Chauhan  Today's Date: 2/26/2024 02/26/24 1204   OT Last Visit   OT Visit Date 02/26/24   Note Type   Note type Cancelled Session   Cancel Reasons Medical status   Additional Comments OT orders received, chart review performed. Per ICU mobility rounds, pt is actively withdrawling and not medically appropriate to participate in OT eval. Will f/u as appropriate     Alexandra Milligan MS, OTR/L

## 2024-02-26 NOTE — CASE MANAGEMENT
Case Management Progress Note    Patient name Eh Chauhan  Location  SDU/-01 S* MRN [id number]  : 1990 Date 2024       LOS (days): 3  Geometric Mean LOS (GMLOS) (days):   Days to GMLOS:        OBJECTIVE:        Current admission status: Inpatient  Preferred Pharmacy:   [facility] [address]  [address] [facility] [address]  Phone: [phone] Fax: [phone]    Primary Care Provider: No primary care provider on file.    Primary Insurance:   Secondary Insurance:     PROGRESS NOTE:  Call placed to [facility] admissions([phone]), admissions informed CM to call nurse charge phone once Pt is medically stable for discharge for report and acceptance. [facility] nurse charge phone at [phone]. CM acknowledges Pt is currently going through withdraw and hallucinating. CM will complete assessment once Pt appropriate to answer questions. CM to follow as Pt progresses.

## 2024-02-26 NOTE — PLAN OF CARE
Problem: Potential for Falls  Goal: Patient will remain free of falls  Description: INTERVENTIONS:  - Educate patient/family on patient safety including physical limitations  - Instruct patient to call for assistance with activity   - Consult OT/PT to assist with strengthening/mobility   - Keep Call bell within reach  - Keep bed low and locked with side rails adjusted as appropriate  - Keep care items and personal belongings within reach  - Initiate and maintain comfort rounds  - Make Fall Risk Sign visible to staff  - Offer Toileting every 2 Hours, in advance of need  - Initiate/Maintain bed alarm  - Obtain necessary fall risk management equipment: non slip socks  - Apply yellow socks and bracelet for high fall risk patients  - Consider moving patient to room near nurses station  Outcome: Progressing     Problem: Prexisting or High Potential for Compromised Skin Integrity  Goal: Skin integrity is maintained or improved  Description: INTERVENTIONS:  - Identify patients at risk for skin breakdown  - Assess and monitor skin integrity  - Assess and monitor nutrition and hydration status  - Monitor labs   - Assess for incontinence   - Turn and reposition patient  - Assist with mobility/ambulation  - Relieve pressure over bony prominences  - Avoid friction and shearing  - Provide appropriate hygiene as needed including keeping skin clean and dry  - Evaluate need for skin moisturizer/barrier cream  - Collaborate with interdisciplinary team   - Patient/family teaching  - Consider wound care consult   Outcome: Progressing     Problem: PAIN - ADULT  Goal: Verbalizes/displays adequate comfort level or baseline comfort level  Description: Interventions:  - Encourage patient to monitor pain and request assistance  - Assess pain using appropriate pain scale  - Administer analgesics based on type and severity of pain and evaluate response  - Implement non-pharmacological measures as appropriate and evaluate response  - Consider  cultural and social influences on pain and pain management  - Notify physician/advanced practitioner if interventions unsuccessful or patient reports new pain  Outcome: Progressing     Problem: INFECTION - ADULT  Goal: Absence or prevention of progression during hospitalization  Description: INTERVENTIONS:  - Assess and monitor for signs and symptoms of infection  - Monitor lab/diagnostic results  - Monitor all insertion sites, i.e. indwelling lines, tubes, and drains  - Monitor endotracheal if appropriate and nasal secretions for changes in amount and color  - [place] appropriate cooling/warming therapies per order  - Administer medications as ordered  - Instruct and encourage patient and family to use good hand hygiene technique  - Identify and instruct in appropriate isolation precautions for identified infection/condition  Outcome: Progressing  Goal: Absence of fever/infection during neutropenic period  Description: INTERVENTIONS:  - Monitor WBC    Outcome: Progressing     Problem: SAFETY ADULT  Goal: Patient will remain free of falls  Description: INTERVENTIONS:  - Educate patient/family on patient safety including physical limitations  - Instruct patient to call for assistance with activity   - Consult OT/PT to assist with strengthening/mobility   - Keep Call bell within reach  - Keep bed low and locked with side rails adjusted as appropriate  - Keep care items and personal belongings within reach  - Initiate and maintain comfort rounds  - Make Fall Risk Sign visible to staff  - Offer Toileting every 2 Hours, in advance of need  - Initiate/Maintain bed alarm  - Obtain necessary fall risk management equipment: non slip socks  - Apply yellow socks and bracelet for high fall risk patients  - Consider moving patient to room near nurses station  Outcome: Progressing  Goal: Maintain or return to baseline ADL function  Description: INTERVENTIONS:  - Educate patient/family on patient safety including physical  limitations  - Instruct patient to call for assistance with activity   - Consult OT/PT to assist with strengthening/mobility   - Keep Call bell within reach  - Keep bed low and locked with side rails adjusted as appropriate  - Keep care items and personal belongings within reach  - Initiate and maintain comfort rounds  - Make Fall Risk Sign visible to staff  - Offer Toileting every 2 Hours, in advance of need  - Initiate/Maintain bed alarm  - Obtain necessary fall risk management equipment: non slip socks  - Apply yellow socks and bracelet for high fall risk patients  - Consider moving patient to room near nurses station  Outcome: Progressing  Goal: Maintains/Returns to pre admission functional level  Description: INTERVENTIONS:  - Perform AM-PAC 6 Click Basic Mobility/ Daily Activity assessment daily.  - Set and communicate daily mobility goal to care team and patient/family/caregiver.   - Collaborate with rehabilitation services on mobility goals if consulted  - Perform Range of Motion 3 times a day.  - Reposition patient every 2 hours.  - Dangle patient 3 times a day  - Stand patient 3 times a day  - Ambulate patient 3 times a day  - Out of bed to chair 3 times a day   - Out of bed for meals 3 times a day  - Out of bed for toileting  - Record patient progress and toleration of activity level   Outcome: Progressing     Problem: DISCHARGE PLANNING  Goal: Discharge to home or other facility with appropriate resources  Description: INTERVENTIONS:  - Identify barriers to discharge w/patient and caregiver  - Arrange for needed discharge resources and transportation as appropriate  - Identify discharge learning needs (meds, wound care, etc.)  - Arrange for interpretive services to assist at discharge as needed  - Refer to Case Management Department for coordinating discharge planning if the patient needs post-hospital services based on physician/advanced practitioner order or complex needs related to functional status,  cognitive ability, or social support system  Outcome: Progressing     Problem: Knowledge Deficit  Goal: Patient/family/caregiver demonstrates understanding of disease process, treatment plan, medications, and discharge instructions  Description: Complete learning assessment and assess knowledge base.  Interventions:  - Provide teaching at level of understanding  - Provide teaching via preferred learning methods  Outcome: Progressing

## 2024-02-26 NOTE — PHYSICAL THERAPY NOTE
PHYSICAL THERAPY CANCELLATION NOTE    Patient Name: Eh Chauhan  Today's Date: 2/26/2024\       02/26/24 1043   PT Last Visit   PT Visit Date 02/26/24   Note Type   Note type Cancelled Session   Cancel Reasons Medical status   Additional Comments PT orders received, chart review performed. Per ICU mobility rounds, pt is actively withdrawling and not medically appropriate to participate in PT eval. Will f/u as appropriate       Marie Sy, PT, DPT

## 2024-02-27 LAB
ALBUMIN SERPL BCP-MCNC: 4.1 G/DL (ref 3.5–5)
ALP SERPL-CCNC: 75 U/L (ref 34–104)
ALT SERPL W P-5'-P-CCNC: 13 U/L (ref 7–52)
ANION GAP SERPL CALCULATED.3IONS-SCNC: 7 MMOL/L
AST SERPL W P-5'-P-CCNC: 13 U/L (ref 13–39)
BASOPHILS # BLD AUTO: 0.05 THOUSANDS/ÂΜL (ref 0–0.1)
BASOPHILS NFR BLD AUTO: 1 % (ref 0–1)
BILIRUB SERPL-MCNC: 0.67 MG/DL (ref 0.2–1)
BUN SERPL-MCNC: 11 MG/DL (ref 5–25)
CALCIUM SERPL-MCNC: 9.5 MG/DL (ref 8.4–10.2)
CHLORIDE SERPL-SCNC: 106 MMOL/L (ref 96–108)
CO2 SERPL-SCNC: 25 MMOL/L (ref 21–32)
CREAT SERPL-MCNC: 0.56 MG/DL (ref 0.6–1.3)
EOSINOPHIL # BLD AUTO: 0.24 THOUSAND/ÂΜL (ref 0–0.61)
EOSINOPHIL NFR BLD AUTO: 3 % (ref 0–6)
ERYTHROCYTE [DISTWIDTH] IN BLOOD BY AUTOMATED COUNT: 12.7 % (ref 11.6–15.1)
GFR SERPL CREATININE-BSD FRML MDRD: 137 ML/MIN/1.73SQ M
GLUCOSE SERPL-MCNC: 103 MG/DL (ref 65–140)
HCT VFR BLD AUTO: 41.7 % (ref 36.5–49.3)
HGB BLD-MCNC: 13.3 G/DL (ref 12–17)
IMM GRANULOCYTES # BLD AUTO: 0.11 THOUSAND/UL (ref 0–0.2)
IMM GRANULOCYTES NFR BLD AUTO: 1 % (ref 0–2)
LYMPHOCYTES # BLD AUTO: 2.47 THOUSANDS/ÂΜL (ref 0.6–4.47)
LYMPHOCYTES NFR BLD AUTO: 28 % (ref 14–44)
MAGNESIUM SERPL-MCNC: 2.1 MG/DL (ref 1.9–2.7)
MCH RBC QN AUTO: 28 PG (ref 26.8–34.3)
MCHC RBC AUTO-ENTMCNC: 31.9 G/DL (ref 31.4–37.4)
MCV RBC AUTO: 88 FL (ref 82–98)
MONOCYTES # BLD AUTO: 0.68 THOUSAND/ÂΜL (ref 0.17–1.22)
MONOCYTES NFR BLD AUTO: 8 % (ref 4–12)
MRSA NOSE QL CULT: ABNORMAL
MRSA NOSE QL CULT: ABNORMAL
NEUTROPHILS # BLD AUTO: 5.22 THOUSANDS/ÂΜL (ref 1.85–7.62)
NEUTS SEG NFR BLD AUTO: 59 % (ref 43–75)
NRBC BLD AUTO-RTO: 0 /100 WBCS
PLATELET # BLD AUTO: 357 THOUSANDS/UL (ref 149–390)
PMV BLD AUTO: 9.6 FL (ref 8.9–12.7)
POTASSIUM SERPL-SCNC: 3.6 MMOL/L (ref 3.5–5.3)
PROCALCITONIN SERPL-MCNC: 0.07 NG/ML
PROT SERPL-MCNC: 7.1 G/DL (ref 6.4–8.4)
RBC # BLD AUTO: 4.75 MILLION/UL (ref 3.88–5.62)
SODIUM SERPL-SCNC: 138 MMOL/L (ref 135–147)
WBC # BLD AUTO: 8.77 THOUSAND/UL (ref 4.31–10.16)

## 2024-02-27 PROCEDURE — 93005 ELECTROCARDIOGRAM TRACING: CPT

## 2024-02-27 PROCEDURE — 99232 SBSQ HOSP IP/OBS MODERATE 35: CPT | Performed by: INTERNAL MEDICINE

## 2024-02-27 PROCEDURE — 87205 SMEAR GRAM STAIN: CPT | Performed by: STUDENT IN AN ORGANIZED HEALTH CARE EDUCATION/TRAINING PROGRAM

## 2024-02-27 PROCEDURE — 80053 COMPREHEN METABOLIC PANEL: CPT | Performed by: INTERNAL MEDICINE

## 2024-02-27 PROCEDURE — 84145 PROCALCITONIN (PCT): CPT | Performed by: INTERNAL MEDICINE

## 2024-02-27 PROCEDURE — 85025 COMPLETE CBC W/AUTO DIFF WBC: CPT | Performed by: INTERNAL MEDICINE

## 2024-02-27 PROCEDURE — 87186 SC STD MICRODIL/AGAR DIL: CPT | Performed by: STUDENT IN AN ORGANIZED HEALTH CARE EDUCATION/TRAINING PROGRAM

## 2024-02-27 PROCEDURE — 87147 CULTURE TYPE IMMUNOLOGIC: CPT | Performed by: STUDENT IN AN ORGANIZED HEALTH CARE EDUCATION/TRAINING PROGRAM

## 2024-02-27 PROCEDURE — 87070 CULTURE OTHR SPECIMN AEROBIC: CPT | Performed by: STUDENT IN AN ORGANIZED HEALTH CARE EDUCATION/TRAINING PROGRAM

## 2024-02-27 PROCEDURE — 83735 ASSAY OF MAGNESIUM: CPT | Performed by: INTERNAL MEDICINE

## 2024-02-27 RX ORDER — CEFTRIAXONE 1 G/50ML
1000 INJECTION, SOLUTION INTRAVENOUS EVERY 24 HOURS
Status: DISCONTINUED | OUTPATIENT
Start: 2024-02-27 | End: 2024-03-02 | Stop reason: HOSPADM

## 2024-02-27 RX ORDER — CLONIDINE 0.1 MG/24H
0.1 PATCH, EXTENDED RELEASE TRANSDERMAL WEEKLY
Status: DISCONTINUED | OUTPATIENT
Start: 2024-02-27 | End: 2024-02-28

## 2024-02-27 RX ORDER — DIAZEPAM 5 MG/ML
5 INJECTION, SOLUTION INTRAMUSCULAR; INTRAVENOUS EVERY 6 HOURS PRN
Status: DISCONTINUED | OUTPATIENT
Start: 2024-02-27 | End: 2024-02-28

## 2024-02-27 RX ADMIN — CEFTRIAXONE 1000 MG: 1 INJECTION, SOLUTION INTRAVENOUS at 12:00

## 2024-02-27 RX ADMIN — GUAIFENESIN 600 MG: 600 TABLET ORAL at 10:03

## 2024-02-27 RX ADMIN — BUPRENORPHINE AND NALOXONE 8 MG: 8; 2 FILM BUCCAL; SUBLINGUAL at 10:03

## 2024-02-27 RX ADMIN — DIAZEPAM 5 MG: 10 INJECTION, SOLUTION INTRAMUSCULAR; INTRAVENOUS at 10:15

## 2024-02-27 RX ADMIN — POTASSIUM CHLORIDE 20 MEQ: 14.9 INJECTION, SOLUTION INTRAVENOUS at 15:48

## 2024-02-27 RX ADMIN — Medication 6 MG: at 21:02

## 2024-02-27 RX ADMIN — METOCLOPRAMIDE 10 MG: 5 INJECTION, SOLUTION INTRAMUSCULAR; INTRAVENOUS at 10:03

## 2024-02-27 RX ADMIN — SODIUM CHLORIDE 100 ML/HR: 0.9 INJECTION, SOLUTION INTRAVENOUS at 14:32

## 2024-02-27 RX ADMIN — POTASSIUM CHLORIDE 20 MEQ: 14.9 INJECTION, SOLUTION INTRAVENOUS at 10:04

## 2024-02-27 RX ADMIN — POTASSIUM CHLORIDE 20 MEQ: 14.9 INJECTION, SOLUTION INTRAVENOUS at 21:04

## 2024-02-27 RX ADMIN — DIAZEPAM 5 MG: 5 TABLET ORAL at 03:12

## 2024-02-27 RX ADMIN — GUAIFENESIN 600 MG: 600 TABLET ORAL at 21:02

## 2024-02-27 RX ADMIN — AZITHROMYCIN MONOHYDRATE 500 MG: 500 INJECTION, POWDER, LYOPHILIZED, FOR SOLUTION INTRAVENOUS at 12:46

## 2024-02-27 RX ADMIN — DIAZEPAM 5 MG: 10 INJECTION, SOLUTION INTRAMUSCULAR; INTRAVENOUS at 15:48

## 2024-02-27 RX ADMIN — SODIUM CHLORIDE 100 ML/HR: 0.9 INJECTION, SOLUTION INTRAVENOUS at 01:13

## 2024-02-27 RX ADMIN — CLONIDINE 0.1 MG: 0.1 PATCH, EXTENDED RELEASE TRANSDERMAL at 11:58

## 2024-02-27 RX ADMIN — CEFEPIME HYDROCHLORIDE 2000 MG: 2 INJECTION, SOLUTION INTRAVENOUS at 04:34

## 2024-02-27 RX ADMIN — BUPRENORPHINE AND NALOXONE 8 MG: 8; 2 FILM BUCCAL; SUBLINGUAL at 17:09

## 2024-02-27 NOTE — ASSESSMENT & PLAN NOTE
Comes from [facility] for where he has been undergoing opioid detox  Discussed with toxicology who was consulted and recommended:  Continue suboxone 8 mg bid to started the evening of 2/23    Discussed case with toxicology again will give 1x dose of ativan   UDS + benzos  Placed on valium prn  Started on clonidine  Denies any other drug use

## 2024-02-27 NOTE — ASSESSMENT & PLAN NOTE
"Pt presenting d/t worsening right LE pain and possible infection    Initially presented on 1/3/24 where he was found to have a calcaneal fracture after jumping over a fence near [facility] for which he was to follow-up with Ortho however was unable to follow-up    On 2/21/2024 had further pain and was told that there was a possible infection and placed on antibiotics. Pt failed abx and was told to come to the ED for further evaluation    Pt meeting sepsis criteria d/t tachypnea and leukocytosis of 17.58 with a source being osteomyelitis  Patient was given Vanco and cefepime and 1 L of fluids in the ED  Lactic acid 2.1  ESR 69  CRP 10  Discussed with podiatry for home was consulted and recommended continuation of antibiotics and MRI    blood cultures NGTD   Will switch cefepime to Rocephin  On azithromycin  Dc'd vanc d/t possible allergic reaction-> continue to montior off of vanc   MRSA culture is positive  MRI showing no osteo at this time  CT c/a/pelvis-- Scattered small groundglass opacities in the right lower greater than upper lobes, suggesting atypical infection.2. Mild circumferential urinary bladder wall thickening. Correlate with urinalysis.3. Skin thickening/edema at the base of the penis/upper scrotum, suggesting cellulitis. Recommend direct inspection. Both testes located in the distal inguinal canals, suggesting cryptorchidism.4. Indeterminate 1.3 cm hypodense right hepatic lobe lesion. Outpatient MRI abdomen with and without contrast can be obtained for further evaluation.\"  C diff ordered  Echo ordered  Urine ag ordered  Sputum cx  MRSA cx  Toradol for severe pain  Trend WBC and fever curve  "

## 2024-02-27 NOTE — PHYSICAL THERAPY NOTE
PHYSICAL THERAPY CANCELLATION NOTE      Patient Name: Eh Chauhan  Today's Date: 2/27/2024 02/27/24 1046   PT Last Visit   PT Visit Date 02/27/24   Note Type   Note type Cancelled Session   Cancel Reasons Medical status   Additional Comments PT orders received, chart review performed. Per ICU mobility rounds, pt is still actively withdrawing and not medically appropriate to participate in PT eval, will f/u as appropriate       Marie Sy, PT, DPT

## 2024-02-27 NOTE — PROGRESS NOTES
[facility]  Progress Note  Name: Eh Chauhan I  MRN: [id number]  Unit/Bed#: -01 SDU I Date of Admission: 2/23/2024   Date of Service: 2/27/2024 I Hospital Day: 4    Assessment/Plan   Fracture of right calcaneus  Assessment & Plan  See mgx above    Abnormal urinalysis  Assessment & Plan  UA showing innumerable bacteria   UC showing no growth    Sepsis without acute organ dysfunction (HCC)  Assessment & Plan  See management above    Opioid use disorder  Assessment & Plan  Comes from [facility] for where he has been undergoing opioid detox  Discussed with toxicology who was consulted and recommended:  Continue suboxone 8 mg bid to started the evening of 2/23    Discussed case with toxicology again will give 1x dose of ativan   UDS + benzos  Placed on valium prn  Started on clonidine  Denies any other drug use        * Sepsis due to pneumonia (HCC)  Assessment & Plan  Pt presenting d/t worsening right LE pain and possible infection    Initially presented on 1/3/24 where he was found to have a calcaneal fracture after jumping over a fence near [facility] for which he was to follow-up with Ortho however was unable to follow-up    On 2/21/2024 had further pain and was told that there was a possible infection and placed on antibiotics. Pt failed abx and was told to come to the ED for further evaluation    Pt meeting sepsis criteria d/t tachypnea and leukocytosis of 17.58 with a source being osteomyelitis  Patient was given Vanco and cefepime and 1 L of fluids in the ED  Lactic acid 2.1  ESR 69  CRP 10  Discussed with podiatry for home was consulted and recommended continuation of antibiotics and MRI    blood cultures NGTD   Will switch cefepime to Rocephin  On azithromycin  Dc'd vanc d/t possible allergic reaction-> continue to montior off of vanc   MRSA culture is positive  MRI showing no osteo at this time  CT c/a/pelvis-- Scattered small groundglass opacities in the right lower  "greater than upper lobes, suggesting atypical infection.2. Mild circumferential urinary bladder wall thickening. Correlate with urinalysis.3. Skin thickening/edema at the base of the penis/upper scrotum, suggesting cellulitis. Recommend direct inspection. Both testes located in the distal inguinal canals, suggesting cryptorchidism.4. Indeterminate 1.3 cm hypodense right hepatic lobe lesion. Outpatient MRI abdomen with and without contrast can be obtained for further evaluation.\"  C diff ordered  Echo ordered  Urine ag ordered  Sputum cx  MRSA cx  Toradol for severe pain  Trend WBC and fever curve           Labs & Imaging: I have personally reviewed pertinent reports.      VTE Prophylaxis: in place.    Code Status:   Level 1 - Full Code    Patient Centered Rounds: I have performed bedside rounds with nursing staff today.    Mobility:   Basic Mobility Inpatient Raw Score: 18  JH-HLM Goal: 6: Walk 10 steps or more  JH-HLM Achieved: 5: Stand (1 or more minutes)  HLM Goal NOT achieved. Continue with multidisciplinary rounding and encourage appropriate mobility to improve upon HLM goals.    Discussions with Specialists or Other Care Team Provider: CM    Education and Discussions with Family / Patient: Aunt at bedside    Total Time Spent on Date of Encounter in care of patient: 35 mins. This time was spent on one or more of the following: performing physical exam; counseling and coordination of care; obtaining or reviewing history; documenting in the medical record; reviewing/ordering tests, medications or procedures; communicating with other healthcare professionals and discussing with patient's family/caregivers.    Current Length of Stay: 4 day(s)    Current Patient Status: Inpatient   Certification Statement: The patient will continue to require additional inpatient hospital stay due to see my assessment and plan.     Subjective:   Patient is seen and examined at bedside.  Patient is hallucinating and confused.  Having " "tremors and diaphoretic.  Afebrile  All other ROS are negative.    Objective:    Vitals: Blood pressure 137/83, pulse 68, temperature 98.4 °F (36.9 °C), temperature source Temporal, resp. rate 21, height 5' 11\" (1.803 m), weight 79.4 kg (175 lb), SpO2 98%.,Body mass index is 24.41 kg/m².  SPO2 RA Rest      Flowsheet Row ED to Hosp-Admission (Current) from 2/23/2024 in [facility] Int[facility]   SpO2 98 %   SpO2 Activity At Rest   O2 Device None (Room air)   O2 Flow Rate --          I&O:   Intake/Output Summary (Last 24 hours) at 2/27/2024 1057  Last data filed at 2/27/2024 0744  Gross per 24 hour   Intake 3671.67 ml   Output 1475 ml   Net 2196.67 ml       Physical Exam:    General- Alert, lying comfortably in bed. Not in any acute distress.  Neck- Supple, No JVD  CVS- regular, S1 and S2 normal  Chest- Bilateral Air entry, No rhochi, crackles or wheezing present.  Abdomen- soft, nontender, not distended, no guarding or rigidity, BS+  Extremities-  No pedal edema, No calf tenderness  CNS-   Alert, awake and orientedx2. No focal deficits present.    Invasive Devices       Peripheral Intravenous Line  Duration             Peripheral IV 02/23/24 Left Antecubital 3 days    Peripheral IV 02/25/24 Left;Ventral (anterior) Forearm 2 days    Peripheral IV 02/27/24 Right Antecubital <1 day                          Social History  reviewed  History reviewed. No pertinent family history. reviewed    Meds:  Current Facility-Administered Medications   Medication Dose Route Frequency Provider Last Rate Last Admin    aluminum-magnesium hydroxide-simethicone (MAALOX) oral suspension 30 mL  30 mL Oral Q6H PRN Amelia Manzano MD        azithromycin (ZITHROMAX) 500 mg in sodium chloride 0.9% 250mL IVPB 500 mg  500 mg Intravenous Q24H Amelia Manzano MD   Stopped at 02/26/24 1500    benzonatate (TESSALON PERLES) capsule 100 mg  100 mg Oral TID PRN Amelia Manzano MD   100 mg at 02/26/24 1313    " buprenorphine-naloxone (Suboxone) film 8 mg  8 mg Sublingual BID Amelia Manzano MD   8 mg at 02/27/24 1003    cefTRIAXone (ROCEPHIN) IVPB (premix in dextrose) 1,000 mg 50 mL  1,000 mg Intravenous Q24H Kuldeep Sanchez MD        cloNIDine (CATAPRES-TTS-1) 0.1 mg/24 hr TD weekly patch  0.1 mg Transdermal Weekly Kuldeep Sanchez MD        diazepam (VALIUM) injection 5 mg  5 mg Intravenous Q6H PRN Kuldeep Sanchez MD   5 mg at 02/27/24 1015    guaiFENesin (MUCINEX) 12 hr tablet 600 mg  600 mg Oral Q12H MEGHAN Amelia Manzano MD   600 mg at 02/27/24 1003    ibuprofen (MOTRIN) tablet 400 mg  400 mg Oral Q6H PRN Amelia Manzano MD        ketorolac (TORADOL) injection 15 mg  15 mg Intravenous Q6H PRN Amelia Manzano MD   15 mg at 02/26/24 1312    melatonin tablet 6 mg  6 mg Oral HS Nery Wayne PA-C   6 mg at 02/26/24 2122    metoclopramide (REGLAN) injection 10 mg  10 mg Intravenous Q6H PRN Amelia Manzano MD   10 mg at 02/27/24 1003    polyethylene glycol (MIRALAX) packet 17 g  17 g Oral Daily PRN Amelia Manzano MD        potassium chloride 20 mEq IVPB (premix)  20 mEq Intravenous TID Amelia Manzano MD 50 mL/hr at 02/27/24 1004 20 mEq at 02/27/24 1004    sodium chloride 0.9 % bolus 1,000 mL  1,000 mL Intravenous Once Amelia Manzano MD 1,000 mL/hr at 02/23/24 2354 Restarted at 02/23/24 2354    sodium chloride 0.9 % infusion  100 mL/hr Intravenous Continuous Kuldeep Sanchez  mL/hr at 02/27/24 0113 100 mL/hr at 02/27/24 0113    trimethobenzamide (TIGAN) IM injection 200 mg  200 mg Intramuscular Q6H PRN Bhavana Reed PA-C   200 mg at 02/24/24 2354      No medications prior to admission.       Labs:  Results from last 7 days   Lab Units 02/27/24  0432 02/26/24  0632 02/25/24  0234   WBC Thousand/uL 8.77 9.45 12.24*   HEMOGLOBIN g/dL 13.3 13.3 12.6   HEMATOCRIT % 41.7 42.0 40.9   PLATELETS Thousands/uL 357 362 368   NEUTROS PCT % 59 54 65   LYMPHS PCT % 28 31 25   MONOS PCT % 8 11 9   EOS PCT % 3 1 0  "    Results from last 7 days   Lab Units 02/27/24  0432 02/26/24  0632 02/25/24  2126 02/25/24  0234   POTASSIUM mmol/L 3.6 3.6 3.5 3.4*   CHLORIDE mmol/L 106 105 105 104   CO2 mmol/L 25 26 26 27   BUN mg/dL 11 9 10 14   CREATININE mg/dL 0.56* 0.57* 0.60 0.65   CALCIUM mg/dL 9.5 9.0 8.9 9.0   ALK PHOS U/L 75 75  --  81   ALT U/L 13 13  --  16   AST U/L 13 13  --  15     No results found for: \"TROPONINI\", \"CKMB\", \"CKTOTAL\"      Lab Results   Component Value Date    BLOODCX No Growth at 72 hrs. 02/23/2024    BLOODCX No Growth at 72 hrs. 02/23/2024    URINECX No Growth <1000 cfu/mL 02/23/2024         Imaging:  Results for orders placed during the hospital encounter of 02/23/24    XR chest 1 view portable    Narrative  XR CHEST PORTABLE    INDICATION: fever. Shortness of breath.    COMPARISON: None    FINDINGS:    Clear lungs. No pneumothorax or pleural effusion.    Normal cardiomediastinal silhouette.    Bones are unremarkable for age.    Normal upper abdomen.    Impression  No acute cardiopulmonary disease.        Workstation performed: [id number]    No results found for this or any previous visit.      Last 24 Hours Medication List:   Current Facility-Administered Medications   Medication Dose Route Frequency Provider Last Rate    aluminum-magnesium hydroxide-simethicone  30 mL Oral Q6H PRN Amelia Manzano MD      azithromycin  500 mg Intravenous Q24H Amelia Manzano MD Stopped (02/26/24 1500)    benzonatate  100 mg Oral TID PRN Amelia Manzano MD      buprenorphine-naloxone  8 mg Sublingual BID Amelia Manzano MD      cefTRIAXone  1,000 mg Intravenous Q24H Kuldeep Sanchez MD      cloNIDine  0.1 mg Transdermal Weekly Kuldeep Sanchez MD      diazepam  5 mg Intravenous Q6H PRN Kuldeep Sanchez MD      guaiFENesin  600 mg Oral Q12H MEGHAN Amelia Manzano MD      ibuprofen  400 mg Oral Q6H PRN Amelia Manzano MD      ketorolac  15 mg Intravenous Q6H PRN Amelia Manzano MD      melatonin  6 mg Oral HS Nery Wayne, " ALVARO      metoclopramide  10 mg Intravenous Q6H PRN Amelia Manzano MD      polyethylene glycol  17 g Oral Daily PRN Amelia Manzano MD      potassium chloride  20 mEq Intravenous TID Amelia Manzano MD 20 mEq (02/27/24 1004)    sodium chloride  1,000 mL Intravenous Once Amelia Manzano MD 1,000 mL/hr at 02/23/24 2354    sodium chloride  100 mL/hr Intravenous Continuous Kuldeep Sanchez  mL/hr (02/27/24 0113)    trimethobenzamide  200 mg Intramuscular Q6H PRN Bhavana Reed PA-C          Today, Patient Was Seen By: Kuldeep Sanchez MD    ** Please Note: Dictation voice to text software may have been used in the creation of this document. **

## 2024-02-27 NOTE — OCCUPATIONAL THERAPY NOTE
Occupational Therapy Cancellation    Patient Name: Eh Chauhan  Today's Date: 2/27/2024 02/27/24 1047   OT Last Visit   OT Visit Date 02/27/24   Note Type   Note type Cancelled Session   Cancel Reasons Medical status   Additional Comments OT orders received, chart review performed. Per ICU mobility rounds, pt is still actively withdrawing and not medically appropriate to participate in OT eval, will f/u as appropriate     Alexandra Milligan MS, OTR/L

## 2024-02-27 NOTE — PLAN OF CARE
Problem: Potential for Falls  Goal: Patient will remain free of falls  Description: INTERVENTIONS:  - Educate patient/family on patient safety including physical limitations  - Instruct patient to call for assistance with activity   - Consult OT/PT to assist with strengthening/mobility   - Keep Call bell within reach  - Keep bed low and locked with side rails adjusted as appropriate  - Keep care items and personal belongings within reach  - Initiate and maintain comfort rounds  - Make Fall Risk Sign visible to staff  - Offer Toileting every 2 Hours, in advance of need  - Initiate/Maintain bed alarm  - Obtain necessary fall risk management equipment: non slip socks  - Apply yellow socks and bracelet for high fall risk patients  - Consider moving patient to room near nurses station  Outcome: Progressing     Problem: Prexisting or High Potential for Compromised Skin Integrity  Goal: Skin integrity is maintained or improved  Description: INTERVENTIONS:  - Identify patients at risk for skin breakdown  - Assess and monitor skin integrity  - Assess and monitor nutrition and hydration status  - Monitor labs   - Assess for incontinence   - Turn and reposition patient  - Assist with mobility/ambulation  - Relieve pressure over bony prominences  - Avoid friction and shearing  - Provide appropriate hygiene as needed including keeping skin clean and dry  - Evaluate need for skin moisturizer/barrier cream  - Collaborate with interdisciplinary team   - Patient/family teaching  - Consider wound care consult   Outcome: Progressing     Problem: PAIN - ADULT  Goal: Verbalizes/displays adequate comfort level or baseline comfort level  Description: Interventions:  - Encourage patient to monitor pain and request assistance  - Assess pain using appropriate pain scale  - Administer analgesics based on type and severity of pain and evaluate response  - Implement non-pharmacological measures as appropriate and evaluate response  - Consider  cultural and social influences on pain and pain management  - Notify physician/advanced practitioner if interventions unsuccessful or patient reports new pain  Outcome: Progressing     Problem: INFECTION - ADULT  Goal: Absence or prevention of progression during hospitalization  Description: INTERVENTIONS:  - Assess and monitor for signs and symptoms of infection  - Monitor lab/diagnostic results  - Monitor all insertion sites, i.e. indwelling lines, tubes, and drains  - Monitor endotracheal if appropriate and nasal secretions for changes in amount and color  - [place] appropriate cooling/warming therapies per order  - Administer medications as ordered  - Instruct and encourage patient and family to use good hand hygiene technique  - Identify and instruct in appropriate isolation precautions for identified infection/condition  Outcome: Progressing  Goal: Absence of fever/infection during neutropenic period  Description: INTERVENTIONS:  - Monitor WBC    Outcome: Progressing     Problem: SAFETY ADULT  Goal: Patient will remain free of falls  Description: INTERVENTIONS:  - Educate patient/family on patient safety including physical limitations  - Instruct patient to call for assistance with activity   - Consult OT/PT to assist with strengthening/mobility   - Keep Call bell within reach  - Keep bed low and locked with side rails adjusted as appropriate  - Keep care items and personal belongings within reach  - Initiate and maintain comfort rounds  - Make Fall Risk Sign visible to staff  - Offer Toileting every 2 Hours, in advance of need  - Initiate/Maintain bed alarm  - Obtain necessary fall risk management equipment: non slip socks  - Apply yellow socks and bracelet for high fall risk patients  - Consider moving patient to room near nurses station  Outcome: Progressing  Goal: Maintain or return to baseline ADL function  Description: INTERVENTIONS:  - Educate patient/family on patient safety including physical  limitations  - Instruct patient to call for assistance with activity   - Consult OT/PT to assist with strengthening/mobility   - Keep Call bell within reach  - Keep bed low and locked with side rails adjusted as appropriate  - Keep care items and personal belongings within reach  - Initiate and maintain comfort rounds  - Make Fall Risk Sign visible to staff  - Offer Toileting every 2 Hours, in advance of need  - Initiate/Maintain bed alarm  - Obtain necessary fall risk management equipment: non slip socks  - Apply yellow socks and bracelet for high fall risk patients  - Consider moving patient to room near nurses station  Outcome: Progressing  Goal: Maintains/Returns to pre admission functional level  Description: INTERVENTIONS:  - Perform AM-PAC 6 Click Basic Mobility/ Daily Activity assessment daily.  - Set and communicate daily mobility goal to care team and patient/family/caregiver.   - Collaborate with rehabilitation services on mobility goals if consulted  - Perform Range of Motion 3 times a day.  - Reposition patient every 2 hours.  - Dangle patient 2 times a day  - Stand patient 2 times a day  - Ambulate patient 2 times a day  - Out of bed to chair 2 times a day   - Out of bed for meals 2 times a day  - Out of bed for toileting  - Record patient progress and toleration of activity level   Outcome: Progressing     Problem: DISCHARGE PLANNING  Goal: Discharge to home or other facility with appropriate resources  Description: INTERVENTIONS:  - Identify barriers to discharge w/patient and caregiver  - Arrange for needed discharge resources and transportation as appropriate  - Identify discharge learning needs (meds, wound care, etc.)  - Arrange for interpretive services to assist at discharge as needed  - Refer to Case Management Department for coordinating discharge planning if the patient needs post-hospital services based on physician/advanced practitioner order or complex needs related to functional status,  cognitive ability, or social support system  Outcome: Progressing     Problem: Knowledge Deficit  Goal: Patient/family/caregiver demonstrates understanding of disease process, treatment plan, medications, and discharge instructions  Description: Complete learning assessment and assess knowledge base.  Interventions:  - Provide teaching at level of understanding  - Provide teaching via preferred learning methods  Outcome: Progressing     Problem: Nutrition/Hydration-ADULT  Goal: Nutrient/Hydration intake appropriate for improving, restoring or maintaining nutritional needs  Description: Monitor and assess patient's nutrition/hydration status for malnutrition. Collaborate with interdisciplinary team and initiate plan and interventions as ordered.  Monitor patient's weight and dietary intake as ordered or per policy. Utilize nutrition screening tool and intervene as necessary. Determine patient's food preferences and provide high-protein, high-caloric foods as appropriate.     INTERVENTIONS:  - Monitor oral intake, urinary output, labs, and treatment plans  - Assess nutrition and hydration status and recommend course of action  - Evaluate amount of meals eaten  - Assist patient with eating if necessary   - Allow adequate time for meals  - Recommend/ encourage appropriate diets, oral nutritional supplements, and vitamin/mineral supplements  - Order, calculate, and assess calorie counts as needed  - Recommend, monitor, and adjust tube feedings and TPN/PPN based on assessed needs  - Assess need for intravenous fluids  - Provide specific nutrition/hydration education as appropriate  - Include patient/family/caregiver in decisions related to nutrition  Outcome: Progressing

## 2024-02-27 NOTE — CASE MANAGEMENT
Case Management Progress Note    Patient name Eh Chauhan  Location  SDU/-01 S* MRN [id number]  : 1990 Date 2024       LOS (days): 4  Geometric Mean LOS (GMLOS) (days):   Days to GMLOS:        OBJECTIVE:        Current admission status: Inpatient  Preferred Pharmacy:   [facility] [address] [place] [id number]  Phone: [phone] Fax: [phone]    Primary Care Provider: No primary care provider on file.    Primary Insurance:   Secondary Insurance:     PROGRESS NOTE:    Pt still withdrawing and hallucinating. CM assessment to be completed once Pt is alert and oriented.

## 2024-02-27 NOTE — PLAN OF CARE
Problem: Potential for Falls  Goal: Patient will remain free of falls  Description: INTERVENTIONS:  - Educate patient/family on patient safety including physical limitations  - Instruct patient to call for assistance with activity   - Consult OT/PT to assist with strengthening/mobility   - Keep Call bell within reach  - Keep bed low and locked with side rails adjusted as appropriate  - Keep care items and personal belongings within reach  - Initiate and maintain comfort rounds  - Make Fall Risk Sign visible to staff  - Offer Toileting every 2 Hours, in advance of need  - Initiate/Maintain bed alarm  - Obtain necessary fall risk management equipment: non slip socks  - Apply yellow socks and bracelet for high fall risk patients  - Consider moving patient to room near nurses station  Outcome: Progressing     Problem: Prexisting or High Potential for Compromised Skin Integrity  Goal: Skin integrity is maintained or improved  Description: INTERVENTIONS:  - Identify patients at risk for skin breakdown  - Assess and monitor skin integrity  - Assess and monitor nutrition and hydration status  - Monitor labs   - Assess for incontinence   - Turn and reposition patient  - Assist with mobility/ambulation  - Relieve pressure over bony prominences  - Avoid friction and shearing  - Provide appropriate hygiene as needed including keeping skin clean and dry  - Evaluate need for skin moisturizer/barrier cream  - Collaborate with interdisciplinary team   - Patient/family teaching  - Consider wound care consult   Outcome: Progressing     Problem: PAIN - ADULT  Goal: Verbalizes/displays adequate comfort level or baseline comfort level  Description: Interventions:  - Encourage patient to monitor pain and request assistance  - Assess pain using appropriate pain scale  - Administer analgesics based on type and severity of pain and evaluate response  - Implement non-pharmacological measures as appropriate and evaluate response  - Consider  cultural and social influences on pain and pain management  - Notify physician/advanced practitioner if interventions unsuccessful or patient reports new pain  Outcome: Progressing     Problem: INFECTION - ADULT  Goal: Absence or prevention of progression during hospitalization  Description: INTERVENTIONS:  - Assess and monitor for signs and symptoms of infection  - Monitor lab/diagnostic results  - Monitor all insertion sites, i.e. indwelling lines, tubes, and drains  - Monitor endotracheal if appropriate and nasal secretions for changes in amount and color  - [place] appropriate cooling/warming therapies per order  - Administer medications as ordered  - Instruct and encourage patient and family to use good hand hygiene technique  - Identify and instruct in appropriate isolation precautions for identified infection/condition  Outcome: Progressing  Goal: Absence of fever/infection during neutropenic period  Description: INTERVENTIONS:  - Monitor WBC    Outcome: Progressing     Problem: SAFETY ADULT  Goal: Patient will remain free of falls  Description: INTERVENTIONS:  - Educate patient/family on patient safety including physical limitations  - Instruct patient to call for assistance with activity   - Consult OT/PT to assist with strengthening/mobility   - Keep Call bell within reach  - Keep bed low and locked with side rails adjusted as appropriate  - Keep care items and personal belongings within reach  - Initiate and maintain comfort rounds  - Make Fall Risk Sign visible to staff  - Offer Toileting every 2 Hours, in advance of need  - Initiate/Maintain bed alarm  - Obtain necessary fall risk management equipment: non slip socks  - Apply yellow socks and bracelet for high fall risk patients  - Consider moving patient to room near nurses station  Outcome: Progressing  Goal: Maintain or return to baseline ADL function  Description: INTERVENTIONS:  - Educate patient/family on patient safety including physical  limitations  - Instruct patient to call for assistance with activity   - Consult OT/PT to assist with strengthening/mobility   - Keep Call bell within reach  - Keep bed low and locked with side rails adjusted as appropriate  - Keep care items and personal belongings within reach  - Initiate and maintain comfort rounds  - Make Fall Risk Sign visible to staff  - Offer Toileting every 2 Hours, in advance of need  - Initiate/Maintain bed alarm  - Obtain necessary fall risk management equipment: non slip socks  - Apply yellow socks and bracelet for high fall risk patients  - Consider moving patient to room near nurses station  Outcome: Progressing  Goal: Maintains/Returns to pre admission functional level  Description: INTERVENTIONS:  - Perform AM-PAC 6 Click Basic Mobility/ Daily Activity assessment daily.  - Set and communicate daily mobility goal to care team and patient/family/caregiver.   - Collaborate with rehabilitation services on mobility goals if consulted    - Out of bed for toileting  - Record patient progress and toleration of activity level   Outcome: Progressing     Problem: DISCHARGE PLANNING  Goal: Discharge to home or other facility with appropriate resources  Description: INTERVENTIONS:  - Identify barriers to discharge w/patient and caregiver  - Arrange for needed discharge resources and transportation as appropriate  - Identify discharge learning needs (meds, wound care, etc.)  - Arrange for interpretive services to assist at discharge as needed  - Refer to Case Management Department for coordinating discharge planning if the patient needs post-hospital services based on physician/advanced practitioner order or complex needs related to functional status, cognitive ability, or social support system  Outcome: Progressing     Problem: Knowledge Deficit  Goal: Patient/family/caregiver demonstrates understanding of disease process, treatment plan, medications, and discharge instructions  Description:  Complete learning assessment and assess knowledge base.  Interventions:  - Provide teaching at level of understanding  - Provide teaching via preferred learning methods  Outcome: Progressing     Problem: Nutrition/Hydration-ADULT  Goal: Nutrient/Hydration intake appropriate for improving, restoring or maintaining nutritional needs  Description: Monitor and assess patient's nutrition/hydration status for malnutrition. Collaborate with interdisciplinary team and initiate plan and interventions as ordered.  Monitor patient's weight and dietary intake as ordered or per policy. Utilize nutrition screening tool and intervene as necessary. Determine patient's food preferences and provide high-protein, high-caloric foods as appropriate.     INTERVENTIONS:  - Monitor oral intake, urinary output, labs, and treatment plans  - Assess nutrition and hydration status and recommend course of action  - Evaluate amount of meals eaten  - Assist patient with eating if necessary   - Allow adequate time for meals  - Recommend/ encourage appropriate diets, oral nutritional supplements, and vitamin/mineral supplements  - Order, calculate, and assess calorie counts as needed  - Recommend, monitor, and adjust tube feedings and TPN/PPN based on assessed needs  - Assess need for intravenous fluids  - Provide specific nutrition/hydration education as appropriate  - Include patient/family/caregiver in decisions related to nutrition  Outcome: Progressing

## 2024-02-28 LAB
ANION GAP SERPL CALCULATED.3IONS-SCNC: 10 MMOL/L
BACTERIA BLD CULT: NORMAL
BACTERIA BLD CULT: NORMAL
BASOPHILS # BLD AUTO: 0.06 THOUSANDS/ÂΜL (ref 0–0.1)
BASOPHILS NFR BLD AUTO: 1 % (ref 0–1)
BUN SERPL-MCNC: 8 MG/DL (ref 5–25)
CALCIUM SERPL-MCNC: 9.6 MG/DL (ref 8.4–10.2)
CHLORIDE SERPL-SCNC: 106 MMOL/L (ref 96–108)
CO2 SERPL-SCNC: 23 MMOL/L (ref 21–32)
CREAT SERPL-MCNC: 0.54 MG/DL (ref 0.6–1.3)
EOSINOPHIL # BLD AUTO: 0.37 THOUSAND/ÂΜL (ref 0–0.61)
EOSINOPHIL NFR BLD AUTO: 4 % (ref 0–6)
ERYTHROCYTE [DISTWIDTH] IN BLOOD BY AUTOMATED COUNT: 12.6 % (ref 11.6–15.1)
GFR SERPL CREATININE-BSD FRML MDRD: 139 ML/MIN/1.73SQ M
GLUCOSE SERPL-MCNC: 96 MG/DL (ref 65–140)
HCT VFR BLD AUTO: 45.8 % (ref 36.5–49.3)
HGB BLD-MCNC: 14.3 G/DL (ref 12–17)
IMM GRANULOCYTES # BLD AUTO: 0.14 THOUSAND/UL (ref 0–0.2)
IMM GRANULOCYTES NFR BLD AUTO: 1 % (ref 0–2)
LYMPHOCYTES # BLD AUTO: 2.93 THOUSANDS/ÂΜL (ref 0.6–4.47)
LYMPHOCYTES NFR BLD AUTO: 28 % (ref 14–44)
MCH RBC QN AUTO: 27.6 PG (ref 26.8–34.3)
MCHC RBC AUTO-ENTMCNC: 31.2 G/DL (ref 31.4–37.4)
MCV RBC AUTO: 88 FL (ref 82–98)
MONOCYTES # BLD AUTO: 0.79 THOUSAND/ÂΜL (ref 0.17–1.22)
MONOCYTES NFR BLD AUTO: 7 % (ref 4–12)
NEUTROPHILS # BLD AUTO: 6.35 THOUSANDS/ÂΜL (ref 1.85–7.62)
NEUTS SEG NFR BLD AUTO: 59 % (ref 43–75)
NRBC BLD AUTO-RTO: 0 /100 WBCS
PLATELET # BLD AUTO: 360 THOUSANDS/UL (ref 149–390)
PMV BLD AUTO: 9.3 FL (ref 8.9–12.7)
POTASSIUM SERPL-SCNC: 3.8 MMOL/L (ref 3.5–5.3)
RBC # BLD AUTO: 5.19 MILLION/UL (ref 3.88–5.62)
SODIUM SERPL-SCNC: 139 MMOL/L (ref 135–147)
WBC # BLD AUTO: 10.64 THOUSAND/UL (ref 4.31–10.16)

## 2024-02-28 PROCEDURE — 93005 ELECTROCARDIOGRAM TRACING: CPT

## 2024-02-28 PROCEDURE — 99232 SBSQ HOSP IP/OBS MODERATE 35: CPT | Performed by: INTERNAL MEDICINE

## 2024-02-28 PROCEDURE — 80048 BASIC METABOLIC PNL TOTAL CA: CPT | Performed by: INTERNAL MEDICINE

## 2024-02-28 PROCEDURE — 85025 COMPLETE CBC W/AUTO DIFF WBC: CPT | Performed by: INTERNAL MEDICINE

## 2024-02-28 RX ORDER — BUPRENORPHINE AND NALOXONE 2; .5 MG/1; MG/1
4 FILM, SOLUBLE BUCCAL; SUBLINGUAL ONCE
Status: COMPLETED | OUTPATIENT
Start: 2024-02-28 | End: 2024-02-28

## 2024-02-28 RX ORDER — DIAZEPAM 5 MG/1
5 TABLET ORAL EVERY 6 HOURS PRN
Status: DISCONTINUED | OUTPATIENT
Start: 2024-02-28 | End: 2024-03-02 | Stop reason: HOSPADM

## 2024-02-28 RX ORDER — CLONIDINE HYDROCHLORIDE 0.1 MG/1
0.1 TABLET ORAL EVERY 12 HOURS PRN
Status: DISCONTINUED | OUTPATIENT
Start: 2024-02-28 | End: 2024-03-02 | Stop reason: HOSPADM

## 2024-02-28 RX ORDER — CLONIDINE 0.1 MG/24H
0.1 PATCH, EXTENDED RELEASE TRANSDERMAL WEEKLY
Status: DISCONTINUED | OUTPATIENT
Start: 2024-02-28 | End: 2024-03-02

## 2024-02-28 RX ORDER — DIAZEPAM 5 MG/1
5 TABLET ORAL ONCE
Status: COMPLETED | OUTPATIENT
Start: 2024-02-28 | End: 2024-02-28

## 2024-02-28 RX ADMIN — SODIUM CHLORIDE 100 ML/HR: 0.9 INJECTION, SOLUTION INTRAVENOUS at 00:08

## 2024-02-28 RX ADMIN — Medication 6 MG: at 21:01

## 2024-02-28 RX ADMIN — POTASSIUM CHLORIDE 20 MEQ: 14.9 INJECTION, SOLUTION INTRAVENOUS at 17:20

## 2024-02-28 RX ADMIN — CEFTRIAXONE 1000 MG: 1 INJECTION, SOLUTION INTRAVENOUS at 11:10

## 2024-02-28 RX ADMIN — IBUPROFEN 400 MG: 400 TABLET, FILM COATED ORAL at 19:34

## 2024-02-28 RX ADMIN — AZITHROMYCIN MONOHYDRATE 500 MG: 500 INJECTION, POWDER, LYOPHILIZED, FOR SOLUTION INTRAVENOUS at 12:25

## 2024-02-28 RX ADMIN — POTASSIUM CHLORIDE 20 MEQ: 14.9 INJECTION, SOLUTION INTRAVENOUS at 21:02

## 2024-02-28 RX ADMIN — SODIUM CHLORIDE 75 ML/HR: 0.9 INJECTION, SOLUTION INTRAVENOUS at 11:13

## 2024-02-28 RX ADMIN — BUPRENORPHINE AND NALOXONE 8 MG: 8; 2 FILM BUCCAL; SUBLINGUAL at 09:12

## 2024-02-28 RX ADMIN — BUPRENORPHINE AND NALOXONE 4 MG: 2; .5 FILM BUCCAL; SUBLINGUAL at 14:25

## 2024-02-28 RX ADMIN — GUAIFENESIN 600 MG: 600 TABLET ORAL at 09:12

## 2024-02-28 RX ADMIN — GUAIFENESIN 600 MG: 600 TABLET ORAL at 21:01

## 2024-02-28 RX ADMIN — CLONIDINE 0.1 MG: 0.1 PATCH, EXTENDED RELEASE TRANSDERMAL at 15:57

## 2024-02-28 RX ADMIN — DIAZEPAM 5 MG: 5 TABLET ORAL at 13:05

## 2024-02-28 RX ADMIN — DIAZEPAM 5 MG: 5 TABLET ORAL at 09:12

## 2024-02-28 RX ADMIN — DIAZEPAM 5 MG: 5 TABLET ORAL at 19:34

## 2024-02-28 RX ADMIN — POTASSIUM CHLORIDE 20 MEQ: 14.9 INJECTION, SOLUTION INTRAVENOUS at 09:26

## 2024-02-28 RX ADMIN — CLONIDINE HYDROCHLORIDE 0.1 MG: 0.1 TABLET ORAL at 11:16

## 2024-02-28 RX ADMIN — BUPRENORPHINE AND NALOXONE 8 MG: 8; 2 FILM BUCCAL; SUBLINGUAL at 17:20

## 2024-02-28 NOTE — PHYSICAL THERAPY NOTE
PHYSICAL THERAPY SCREEN NOTE    Patient Name: Eh Chauhan  Today's Date: 2/28/2024 02/28/24 1222   Note Type   Note type Screen   Additional Comments PT orders received, chart review performed. Spoke to RN Mark Penaloza. Pt was ambulatory modified I w/ crutches and CAM boot around ICU. Pt w/ no acute PT needs, will DC PT       Marie Sy, PT, DPT

## 2024-02-28 NOTE — PROGRESS NOTES
[facility]  Progress Note  Name: Eh Chauhan I  MRN: [id number]  Unit/Bed#: -01 SDU I Date of Admission: 2/23/2024   Date of Service: 2/28/2024 I Hospital Day: 5    Assessment/Plan   Fracture of right calcaneus  Assessment & Plan  See mgx above    Abnormal urinalysis  Assessment & Plan  UA showing innumerable bacteria   UC showing no growth    Sepsis without acute organ dysfunction (HCC)  Assessment & Plan  See management above    Opioid use disorder  Assessment & Plan  Comes from [facility] for where he has been undergoing opioid detox  Discussed with toxicology who was consulted and recommended:  Continue suboxone 8 mg bid to started the evening of 2/23    Discussed case with toxicology again will give 1x dose of ativan   UDS + benzos  Placed on valium prn  Started on clonidine  Denies any other drug use        * Sepsis due to pneumonia (HCC)  Assessment & Plan  Pt presenting d/t worsening right LE pain and possible infection    Initially presented on 1/3/24 where he was found to have a calcaneal fracture after jumping over a fence near [facility] for which he was to follow-up with Ortho however was unable to follow-up    On 2/21/2024 had further pain and was told that there was a possible infection and placed on antibiotics. Pt failed abx and was told to come to the ED for further evaluation    Pt meeting sepsis criteria d/t tachypnea and leukocytosis of 17.58 with a source being osteomyelitis  Patient was given Vanco and cefepime and 1 L of fluids in the ED  Lactic acid 2.1  ESR 69  CRP 10  blood cultures NGTD   On Rocephin and Zithromax  Dc'd vanc d/t possible allergic reaction-> continue to montior off of vanc   MRSA culture is positive  MRI showing no osteo at this time  CT c/a/pelvis-- Scattered small groundglass opacities in the right lower greater than upper lobes, suggesting atypical infection.2. Mild circumferential urinary bladder wall thickening. Correlate with  "urinalysis.3. Skin thickening/edema at the base of the penis/upper scrotum, suggesting cellulitis. Recommend direct inspection. Both testes located in the distal inguinal canals, suggesting cryptorchidism.4. Indeterminate 1.3 cm hypodense right hepatic lobe lesion. Outpatient MRI abdomen with and without contrast can be obtained for further evaluation.\"  C diff negative  Echo ejection fraction of 55% with normal wall motion.  Toradol for severe pain  Trend WBC and fever curve             Labs & Imaging: I have personally reviewed pertinent reports.      VTE Prophylaxis: in place.    Code Status:   Level 1 - Full Code    Patient Centered Rounds: I have performed bedside rounds with nursing staff today.    Mobility:   Basic Mobility Inpatient Raw Score: 18  -HLM Goal: 6: Walk 10 steps or more  JH-HLM Achieved: 3: Sit at edge of bed  HLM Goal NOT achieved. Continue with multidisciplinary rounding and encourage appropriate mobility to improve upon HLM goals.    Discussions with Specialists or Other Care Team Provider: RN    Education and Discussions with Family / Patient: Aunmichael Moore on phone    Total Time Spent on Date of Encounter in care of patient: 35 mins. This time was spent on one or more of the following: performing physical exam; counseling and coordination of care; obtaining or reviewing history; documenting in the medical record; reviewing/ordering tests, medications or procedures; communicating with other healthcare professionals and discussing with patient's family/caregivers.    Current Length of Stay: 5 day(s)    Current Patient Status: Inpatient   Certification Statement: The patient will continue to require additional inpatient hospital stay due to see my assessment and plan.     Subjective:   Patient is seen and examined at bedside.  Mentation is significantly improved.  No tremors noticed.  Afebrile  All other ROS are negative.    Objective:    Vitals: Blood pressure 145/85, pulse 74, temperature " "97.8 °F (36.6 °C), temperature source Oral, resp. rate 21, height 5' 11\" (1.803 m), weight 79.4 kg (175 lb), SpO2 100%.,Body mass index is 24.41 kg/m².  SPO2 RA Rest      Flowsheet Row ED to Hosp-Admission (Current) from 2/23/2024 in [facility] Intensive Care Unit   SpO2 100 %   SpO2 Activity At Rest   O2 Device None (Room air)   O2 Flow Rate --          I&O:   Intake/Output Summary (Last 24 hours) at 2/28/2024 0832  Last data filed at 2/28/2024 0728  Gross per 24 hour   Intake 2698.33 ml   Output 1750 ml   Net 948.33 ml       Physical Exam:    General- Alert, sitting comfortably in chair. Not in any acute distress.  Neck- Supple, No JVD  CVS- regular, S1 and S2 normal  Chest- Bilateral Air entry, No rhochi, crackles or wheezing present.  Abdomen- soft, nontender, not distended, no guarding or rigidity, BS+  Extremities-  No pedal edema, No calf tenderness  CNS-   Alert, awake and orientedx2. No focal deficits present.    Invasive Devices       Peripheral Intravenous Line  Duration             Peripheral IV 02/25/24 Left;Ventral (anterior) Forearm 3 days    Peripheral IV 02/27/24 Right Antecubital 1 day                          Social History  reviewed  History reviewed. No pertinent family history. reviewed    Meds:  Current Facility-Administered Medications   Medication Dose Route Frequency Provider Last Rate Last Admin    aluminum-magnesium hydroxide-simethicone (MAALOX) oral suspension 30 mL  30 mL Oral Q6H PRN Amelia Manzano MD        azithromycin (ZITHROMAX) 500 mg in sodium chloride 0.9% 250mL IVPB 500 mg  500 mg Intravenous Q24H Amelia Manzano MD   Stopped at 02/27/24 1701    benzonatate (TESSALON PERLES) capsule 100 mg  100 mg Oral TID PRN Amelia Manzano MD   100 mg at 02/26/24 1313    buprenorphine-naloxone (Suboxone) film 8 mg  8 mg Sublingual BID Amelia Manzano MD   8 mg at 02/27/24 1709    cefTRIAXone (ROCEPHIN) IVPB (premix in dextrose) 1,000 mg 50 mL  1,000 mg Intravenous Q24H " Kuldeep Sanchez MD   Stopped at 02/27/24 1246    cloNIDine (CATAPRES-TTS-1) 0.1 mg/24 hr TD weekly patch  0.1 mg Transdermal Weekly Kuldeep Sanchez MD   0.1 mg at 02/27/24 1158    diazepam (VALIUM) injection 5 mg  5 mg Intravenous Q6H PRN Kuldeep Sanchez MD   5 mg at 02/27/24 1548    guaiFENesin (MUCINEX) 12 hr tablet 600 mg  600 mg Oral Q12H MEGHAN Amelia Manzano MD   600 mg at 02/27/24 2102    ibuprofen (MOTRIN) tablet 400 mg  400 mg Oral Q6H PRN Amelia Manzano MD        melatonin tablet 6 mg  6 mg Oral HS Nery Wayne PA-C   6 mg at 02/27/24 2102    metoclopramide (REGLAN) injection 10 mg  10 mg Intravenous Q6H PRN Amelia Manzano MD   10 mg at 02/27/24 1003    polyethylene glycol (MIRALAX) packet 17 g  17 g Oral Daily PRN Amelia Manzano MD        potassium chloride 20 mEq IVPB (premix)  20 mEq Intravenous TID Amelia Manzano MD 50 mL/hr at 02/27/24 2104 20 mEq at 02/27/24 2104    sodium chloride 0.9 % bolus 1,000 mL  1,000 mL Intravenous Once Amelia Manzano MD 1,000 mL/hr at 02/23/24 2354 Restarted at 02/23/24 2354    sodium chloride 0.9 % infusion  100 mL/hr Intravenous Continuous Kuldeep Sanchez  mL/hr at 02/28/24 0008 100 mL/hr at 02/28/24 0008    trimethobenzamide (TIGAN) IM injection 200 mg  200 mg Intramuscular Q6H PRN Bhavana Reed PA-C   200 mg at 02/24/24 2354      No medications prior to admission.       Labs:  Results from last 7 days   Lab Units 02/28/24  0502 02/27/24  0432 02/26/24  0632   WBC Thousand/uL 10.64* 8.77 9.45   HEMOGLOBIN g/dL 14.3 13.3 13.3   HEMATOCRIT % 45.8 41.7 42.0   PLATELETS Thousands/uL 360 357 362   NEUTROS PCT % 59 59 54   LYMPHS PCT % 28 28 31   MONOS PCT % 7 8 11   EOS PCT % 4 3 1     Results from last 7 days   Lab Units 02/28/24  0502 02/27/24  0432 02/26/24  0632 02/25/24  2126 02/25/24  0234   POTASSIUM mmol/L 3.8 3.6 3.6   < > 3.4*   CHLORIDE mmol/L 106 106 105   < > 104   CO2 mmol/L 23 25 26   < > 27   BUN mg/dL 8 11 9   < > 14   CREATININE  "mg/dL 0.54* 0.56* 0.57*   < > 0.65   CALCIUM mg/dL 9.6 9.5 9.0   < > 9.0   ALK PHOS U/L  --  75 75  --  81   ALT U/L  --  13 13  --  16   AST U/L  --  13 13  --  15    < > = values in this interval not displayed.     No results found for: \"TROPONINI\", \"CKMB\", \"CKTOTAL\"      Lab Results   Component Value Date    BLOODCX No Growth After 4 Days. 02/23/2024    BLOODCX No Growth After 4 Days. 02/23/2024    URINECX No Growth <1000 cfu/mL 02/23/2024         Imaging:  Results for orders placed during the hospital encounter of 02/23/24    XR chest 1 view portable    Narrative  XR CHEST PORTABLE    INDICATION: fever. Shortness of breath.    COMPARISON: None    FINDINGS:    Clear lungs. No pneumothorax or pleural effusion.    Normal cardiomediastinal silhouette.    Bones are unremarkable for age.    Normal upper abdomen.    Impression  No acute cardiopulmonary disease.        Workstation performed: [id number]    No results found for this or any previous visit.      Last 24 Hours Medication List:   Current Facility-Administered Medications   Medication Dose Route Frequency Provider Last Rate    aluminum-magnesium hydroxide-simethicone  30 mL Oral Q6H PRN Amelia Manzano MD      azithromycin  500 mg Intravenous Q24H Amelia Manzano MD Stopped (02/27/24 1701)    benzonatate  100 mg Oral TID PRN Amelia Manzano MD      buprenorphine-naloxone  8 mg Sublingual BID Amelia Manzano MD      cefTRIAXone  1,000 mg Intravenous Q24H Kuldeep Sanchez MD Stopped (02/27/24 1246)    cloNIDine  0.1 mg Transdermal Weekly Kuldeep Sanchez MD      diazepam  5 mg Intravenous Q6H PRN Kuldeep Sanchez MD      guaiFENesin  600 mg Oral Q12H MEGHAN Amelia Manzano MD      ibuprofen  400 mg Oral Q6H PRN Amelia Manzano MD      melatonin  6 mg Oral HS Nery Wayne PA-C      metoclopramide  10 mg Intravenous Q6H PRN Amelia Manzano MD      polyethylene glycol  17 g Oral Daily PRN Amelia Manzano MD      potassium chloride  20 mEq Intravenous TID " Amelia Manzano MD 20 mEq (02/27/24 2104)    sodium chloride  1,000 mL Intravenous Once Amelia Manzano MD 1,000 mL/hr at 02/23/24 2354    sodium chloride  100 mL/hr Intravenous Continuous Kuldeep Sanchez  mL/hr (02/28/24 0008)    trimethobenzamide  200 mg Intramuscular Q6H PRN Bhavana Reed PA-C          Today, Patient Was Seen By: Kuldeep Sanchez MD    ** Please Note: Dictation voice to text software may have been used in the creation of this document. **

## 2024-02-28 NOTE — OCCUPATIONAL THERAPY NOTE
Occupational Therapy Screen    Patient Name: Eh Chauhan  Today's Date: 2/28/2024 02/28/24 1051   OT Last Visit   OT Visit Date 02/28/24   Note Type   Note type Screen   Additional Comments OT orders received and chart reviewed. Per ICU rounds, pt is currently independent in room & hallway.  Recommend pt continue to be OOB for meals, ambulation to/from BR, perform self care tasks, and mobility in hallway with nursing.  At this time, OT recommendations at time of discharge are return home at prior level of function. No acute OT needs identified at this time; please re-consult if OT needs arise during remainder of hospital stay.     Alexandra Milligan MS, OTR/L

## 2024-02-28 NOTE — PLAN OF CARE
Problem: Potential for Falls  Goal: Patient will remain free of falls  Description: INTERVENTIONS:  - Educate patient/family on patient safety including physical limitations  - Instruct patient to call for assistance with activity   - Consult OT/PT to assist with strengthening/mobility   - Keep Call bell within reach  - Keep bed low and locked with side rails adjusted as appropriate  - Keep care items and personal belongings within reach  - Initiate and maintain comfort rounds  - Make Fall Risk Sign visible to staff  - Offer Toileting every 2 Hours, in advance of need  - Initiate/Maintain bed alarm  - Obtain necessary fall risk management equipment: non slip socks  - Apply yellow socks and bracelet for high fall risk patients  - Consider moving patient to room near nurses station  Outcome: Progressing     Problem: Prexisting or High Potential for Compromised Skin Integrity  Goal: Skin integrity is maintained or improved  Description: INTERVENTIONS:  - Identify patients at risk for skin breakdown  - Assess and monitor skin integrity  - Assess and monitor nutrition and hydration status  - Monitor labs   - Assess for incontinence   - Turn and reposition patient  - Assist with mobility/ambulation  - Relieve pressure over bony prominences  - Avoid friction and shearing  - Provide appropriate hygiene as needed including keeping skin clean and dry  - Evaluate need for skin moisturizer/barrier cream  - Collaborate with interdisciplinary team   - Patient/family teaching  - Consider wound care consult   Outcome: Progressing     Problem: PAIN - ADULT  Goal: Verbalizes/displays adequate comfort level or baseline comfort level  Description: Interventions:  - Encourage patient to monitor pain and request assistance  - Assess pain using appropriate pain scale  - Administer analgesics based on type and severity of pain and evaluate response  - Implement non-pharmacological measures as appropriate and evaluate response  - Consider  cultural and social influences on pain and pain management  - Notify physician/advanced practitioner if interventions unsuccessful or patient reports new pain  Outcome: Progressing     Problem: INFECTION - ADULT  Goal: Absence or prevention of progression during hospitalization  Description: INTERVENTIONS:  - Assess and monitor for signs and symptoms of infection  - Monitor lab/diagnostic results  - Monitor all insertion sites, i.e. indwelling lines, tubes, and drains  - Monitor endotracheal if appropriate and nasal secretions for changes in amount and color  - [place] appropriate cooling/warming therapies per order  - Administer medications as ordered  - Instruct and encourage patient and family to use good hand hygiene technique  - Identify and instruct in appropriate isolation precautions for identified infection/condition  Outcome: Progressing  Goal: Absence of fever/infection during neutropenic period  Description: INTERVENTIONS:  - Monitor WBC    Outcome: Progressing     Problem: SAFETY ADULT  Goal: Patient will remain free of falls  Description: INTERVENTIONS:  - Educate patient/family on patient safety including physical limitations  - Instruct patient to call for assistance with activity   - Consult OT/PT to assist with strengthening/mobility   - Keep Call bell within reach  - Keep bed low and locked with side rails adjusted as appropriate  - Keep care items and personal belongings within reach  - Initiate and maintain comfort rounds  - Make Fall Risk Sign visible to staff  - Offer Toileting every 2 Hours, in advance of need  - Initiate/Maintain bed alarm  - Obtain necessary fall risk management equipment: non slip socks  - Apply yellow socks and bracelet for high fall risk patients  - Consider moving patient to room near nurses station  Outcome: Progressing  Goal: Maintain or return to baseline ADL function  Description: INTERVENTIONS:  - Educate patient/family on patient safety including physical  limitations  - Instruct patient to call for assistance with activity   - Consult OT/PT to assist with strengthening/mobility   - Keep Call bell within reach  - Keep bed low and locked with side rails adjusted as appropriate  - Keep care items and personal belongings within reach  - Initiate and maintain comfort rounds  - Make Fall Risk Sign visible to staff  - Offer Toileting every 2 Hours, in advance of need  - Initiate/Maintain bed alarm  - Obtain necessary fall risk management equipment: non slip socks  - Apply yellow socks and bracelet for high fall risk patients  - Consider moving patient to room near nurses station  Outcome: Progressing  Goal: Maintains/Returns to pre admission functional level  Description: INTERVENTIONS:  - Perform AM-PAC 6 Click Basic Mobility/ Daily Activity assessment daily.  - Set and communicate daily mobility goal to care team and patient/family/caregiver.   - Collaborate with rehabilitation services on mobility goals if consulted  - Out of bed for toileting  - Record patient progress and toleration of activity level   Outcome: Progressing     Problem: DISCHARGE PLANNING  Goal: Discharge to home or other facility with appropriate resources  Description: INTERVENTIONS:  - Identify barriers to discharge w/patient and caregiver  - Arrange for needed discharge resources and transportation as appropriate  - Identify discharge learning needs (meds, wound care, etc.)  - Arrange for interpretive services to assist at discharge as needed  - Refer to Case Management Department for coordinating discharge planning if the patient needs post-hospital services based on physician/advanced practitioner order or complex needs related to functional status, cognitive ability, or social support system  Outcome: Progressing     Problem: Knowledge Deficit  Goal: Patient/family/caregiver demonstrates understanding of disease process, treatment plan, medications, and discharge instructions  Description:  Complete learning assessment and assess knowledge base.  Interventions:  - Provide teaching at level of understanding  - Provide teaching via preferred learning methods  Outcome: Progressing     Problem: Nutrition/Hydration-ADULT  Goal: Nutrient/Hydration intake appropriate for improving, restoring or maintaining nutritional needs  Description: Monitor and assess patient's nutrition/hydration status for malnutrition. Collaborate with interdisciplinary team and initiate plan and interventions as ordered.  Monitor patient's weight and dietary intake as ordered or per policy. Utilize nutrition screening tool and intervene as necessary. Determine patient's food preferences and provide high-protein, high-caloric foods as appropriate.     INTERVENTIONS:  - Monitor oral intake, urinary output, labs, and treatment plans  - Assess nutrition and hydration status and recommend course of action  - Evaluate amount of meals eaten  - Assist patient with eating if necessary   - Allow adequate time for meals  - Recommend/ encourage appropriate diets, oral nutritional supplements, and vitamin/mineral supplements  - Order, calculate, and assess calorie counts as needed  - Recommend, monitor, and adjust tube feedings and TPN/PPN based on assessed needs  - Assess need for intravenous fluids  - Provide specific nutrition/hydration education as appropriate  - Include patient/family/caregiver in decisions related to nutrition  Outcome: Progressing

## 2024-02-28 NOTE — ASSESSMENT & PLAN NOTE
"Pt presenting d/t worsening right LE pain and possible infection    Initially presented on 1/3/24 where he was found to have a calcaneal fracture after jumping over a fence near [facility] for which he was to follow-up with Ortho however was unable to follow-up    On 2/21/2024 had further pain and was told that there was a possible infection and placed on antibiotics. Pt failed abx and was told to come to the ED for further evaluation    Pt meeting sepsis criteria d/t tachypnea and leukocytosis of 17.58 with a source being osteomyelitis  Patient was given Vanco and cefepime and 1 L of fluids in the ED  Lactic acid 2.1  ESR 69  CRP 10  blood cultures NGTD   On Rocephin and Zithromax  Dc'd vanc d/t possible allergic reaction-> continue to montior off of vanc   MRSA culture is positive  MRI showing no osteo at this time  CT c/a/pelvis-- Scattered small groundglass opacities in the right lower greater than upper lobes, suggesting atypical infection.2. Mild circumferential urinary bladder wall thickening. Correlate with urinalysis.3. Skin thickening/edema at the base of the penis/upper scrotum, suggesting cellulitis. Recommend direct inspection. Both testes located in the distal inguinal canals, suggesting cryptorchidism.4. Indeterminate 1.3 cm hypodense right hepatic lobe lesion. Outpatient MRI abdomen with and without contrast can be obtained for further evaluation.\"  C diff negative  Echo ejection fraction of 55% with normal wall motion.  Toradol for severe pain  Trend WBC and fever curve  "

## 2024-02-28 NOTE — ASSESSMENT & PLAN NOTE
Comes from [facility] for where he has been undergoing opioid detox  Discussed with toxicology who was consulted and recommended:  Continue suboxone 8 mg bid to started the evening of 2/23    Discussed case with toxicology again will give 1x dose of ativan   UDS + benzos  Placed on valium prn  Started on clonidine  Denies any other drug use

## 2024-02-29 LAB
ANION GAP SERPL CALCULATED.3IONS-SCNC: 5 MMOL/L
ATRIAL RATE: 89 BPM
ATRIAL RATE: 89 BPM
BASOPHILS # BLD AUTO: 0.05 THOUSANDS/ÂΜL (ref 0–0.1)
BASOPHILS NFR BLD AUTO: 1 % (ref 0–1)
BUN SERPL-MCNC: 8 MG/DL (ref 5–25)
CALCIUM SERPL-MCNC: 7.9 MG/DL (ref 8.4–10.2)
CHLORIDE SERPL-SCNC: 114 MMOL/L (ref 96–108)
CO2 SERPL-SCNC: 23 MMOL/L (ref 21–32)
CREAT SERPL-MCNC: 0.42 MG/DL (ref 0.6–1.3)
EOSINOPHIL # BLD AUTO: 0.54 THOUSAND/ÂΜL (ref 0–0.61)
EOSINOPHIL NFR BLD AUTO: 5 % (ref 0–6)
ERYTHROCYTE [DISTWIDTH] IN BLOOD BY AUTOMATED COUNT: 13 % (ref 11.6–15.1)
GFR SERPL CREATININE-BSD FRML MDRD: 155 ML/MIN/1.73SQ M
GLUCOSE SERPL-MCNC: 95 MG/DL (ref 65–140)
HCT VFR BLD AUTO: 36.7 % (ref 36.5–49.3)
HGB BLD-MCNC: 11.7 G/DL (ref 12–17)
IMM GRANULOCYTES # BLD AUTO: 0.13 THOUSAND/UL (ref 0–0.2)
IMM GRANULOCYTES NFR BLD AUTO: 1 % (ref 0–2)
LYMPHOCYTES # BLD AUTO: 2.64 THOUSANDS/ÂΜL (ref 0.6–4.47)
LYMPHOCYTES NFR BLD AUTO: 27 % (ref 14–44)
MCH RBC QN AUTO: 28.5 PG (ref 26.8–34.3)
MCHC RBC AUTO-ENTMCNC: 31.9 G/DL (ref 31.4–37.4)
MCV RBC AUTO: 89 FL (ref 82–98)
MONOCYTES # BLD AUTO: 0.57 THOUSAND/ÂΜL (ref 0.17–1.22)
MONOCYTES NFR BLD AUTO: 6 % (ref 4–12)
NEUTROPHILS # BLD AUTO: 6 THOUSANDS/ÂΜL (ref 1.85–7.62)
NEUTS SEG NFR BLD AUTO: 60 % (ref 43–75)
NRBC BLD AUTO-RTO: 0 /100 WBCS
P AXIS: 145 DEGREES
PLATELET # BLD AUTO: 309 THOUSANDS/UL (ref 149–390)
PMV BLD AUTO: 9.5 FL (ref 8.9–12.7)
POTASSIUM SERPL-SCNC: 3 MMOL/L (ref 3.5–5.3)
PR INTERVAL: 100 MS
QRS AXIS: 0 DEGREES
QRS AXIS: 20 DEGREES
QRSD INTERVAL: 72 MS
QRSD INTERVAL: 76 MS
QT INTERVAL: 328 MS
QT INTERVAL: 370 MS
QTC INTERVAL: 399 MS
QTC INTERVAL: 447 MS
RBC # BLD AUTO: 4.11 MILLION/UL (ref 3.88–5.62)
SODIUM SERPL-SCNC: 142 MMOL/L (ref 135–147)
T WAVE AXIS: 18 DEGREES
T WAVE AXIS: 68 DEGREES
VENTRICULAR RATE: 88 BPM
VENTRICULAR RATE: 89 BPM
WBC # BLD AUTO: 9.93 THOUSAND/UL (ref 4.31–10.16)

## 2024-02-29 PROCEDURE — 93010 ELECTROCARDIOGRAM REPORT: CPT | Performed by: INTERNAL MEDICINE

## 2024-02-29 PROCEDURE — 99232 SBSQ HOSP IP/OBS MODERATE 35: CPT | Performed by: INTERNAL MEDICINE

## 2024-02-29 PROCEDURE — 93005 ELECTROCARDIOGRAM TRACING: CPT

## 2024-02-29 PROCEDURE — 80048 BASIC METABOLIC PNL TOTAL CA: CPT | Performed by: INTERNAL MEDICINE

## 2024-02-29 PROCEDURE — 85025 COMPLETE CBC W/AUTO DIFF WBC: CPT | Performed by: INTERNAL MEDICINE

## 2024-02-29 RX ORDER — POTASSIUM CHLORIDE 20 MEQ/1
40 TABLET, EXTENDED RELEASE ORAL EVERY 4 HOURS
Status: COMPLETED | OUTPATIENT
Start: 2024-02-29 | End: 2024-02-29

## 2024-02-29 RX ORDER — LORAZEPAM 2 MG/ML
1 INJECTION INTRAMUSCULAR ONCE
Status: COMPLETED | OUTPATIENT
Start: 2024-02-29 | End: 2024-02-29

## 2024-02-29 RX ADMIN — CEFTRIAXONE 1000 MG: 1 INJECTION, SOLUTION INTRAVENOUS at 11:27

## 2024-02-29 RX ADMIN — BUPRENORPHINE AND NALOXONE 8 MG: 8; 2 FILM BUCCAL; SUBLINGUAL at 08:36

## 2024-02-29 RX ADMIN — POLYETHYLENE GLYCOL 3350 17 G: 17 POWDER, FOR SOLUTION ORAL at 16:36

## 2024-02-29 RX ADMIN — LORAZEPAM 1 MG: 2 INJECTION INTRAMUSCULAR; INTRAVENOUS at 12:28

## 2024-02-29 RX ADMIN — SODIUM CHLORIDE 75 ML/HR: 0.9 INJECTION, SOLUTION INTRAVENOUS at 16:21

## 2024-02-29 RX ADMIN — AZITHROMYCIN MONOHYDRATE 500 MG: 500 INJECTION, POWDER, LYOPHILIZED, FOR SOLUTION INTRAVENOUS at 12:24

## 2024-02-29 RX ADMIN — DIAZEPAM 5 MG: 5 TABLET ORAL at 21:24

## 2024-02-29 RX ADMIN — IBUPROFEN 400 MG: 400 TABLET, FILM COATED ORAL at 10:47

## 2024-02-29 RX ADMIN — GUAIFENESIN 600 MG: 600 TABLET ORAL at 08:36

## 2024-02-29 RX ADMIN — DIAZEPAM 5 MG: 5 TABLET ORAL at 11:26

## 2024-02-29 RX ADMIN — POTASSIUM CHLORIDE 40 MEQ: 1500 TABLET, EXTENDED RELEASE ORAL at 09:52

## 2024-02-29 RX ADMIN — POTASSIUM CHLORIDE 40 MEQ: 1500 TABLET, EXTENDED RELEASE ORAL at 12:27

## 2024-02-29 RX ADMIN — GUAIFENESIN 600 MG: 600 TABLET ORAL at 21:24

## 2024-02-29 RX ADMIN — POTASSIUM CHLORIDE 20 MEQ: 14.9 INJECTION, SOLUTION INTRAVENOUS at 08:36

## 2024-02-29 RX ADMIN — CLONIDINE HYDROCHLORIDE 0.1 MG: 0.1 TABLET ORAL at 03:11

## 2024-02-29 RX ADMIN — Medication 6 MG: at 21:24

## 2024-02-29 RX ADMIN — DIAZEPAM 5 MG: 5 TABLET ORAL at 04:49

## 2024-02-29 RX ADMIN — SODIUM CHLORIDE 75 ML/HR: 0.9 INJECTION, SOLUTION INTRAVENOUS at 02:26

## 2024-02-29 RX ADMIN — BUPRENORPHINE AND NALOXONE 8 MG: 8; 2 FILM BUCCAL; SUBLINGUAL at 17:33

## 2024-02-29 NOTE — ASSESSMENT & PLAN NOTE
Comes from [facility] for where he has been undergoing opioid detox  Discussed with toxicology who was consulted and recommended:  Continue suboxone 8 mg bid to started the evening of 2/23    Discussed case with toxicology again will give 1x dose of ativan   UDS + benzos  Placed on valium prn  Started on clonidine patch  Denies any other drug use

## 2024-02-29 NOTE — CASE MANAGEMENT
Case Management Assessment & Discharge Planning Note    Patient name Eh Chauhan  Location  SDU/-01 S* MRN [id number]  : 1990 Date 2024       Current Admission Date: 2024  Current Admission Diagnosis:Sepsis due to pneumonia (HCC)   Patient Active Problem List    Diagnosis Date Noted    Sepsis due to pneumonia (HCC) 2024    Opioid use disorder 2024    Sepsis without acute organ dysfunction (HCC) 2024    Abnormal urinalysis 2024    Fracture of right calcaneus 2024      LOS (days): 6  Geometric Mean LOS (GMLOS) (days):   Days to GMLOS:     OBJECTIVE:    Risk of Unplanned Readmission Score: 10.09         Current admission status: Inpatient       Preferred Pharmacy:   [facility] [address] [place], [place] - [address]  [address] [address]  Phone: [phone] Fax: [phone]    Primary Care Provider: No primary care provider on file.    Primary Insurance:   Secondary Insurance:     ASSESSMENT:  Active Health Care Proxies    There are no active Health Care Proxies on file.                 Readmission Root Cause  30 Day Readmission: No    Patient Information  Admitted from:: Facility  Mental Status: Alert  During Assessment patient was accompanied by: Not accompanied during assessment  Assessment information provided by:: Patient  Primary Caregiver: Self  Support Systems: Self  County of Residence: [place]  What [facility] do you live in?: [place]  Living Arrangements: Lives Alone  Is patient a ?: No    Activities of Daily Living Prior to Admission  Functional Status: Independent  Completes ADLs independently?: Yes  Ambulates independently?: Yes  Does patient use assisted devices?: No  Does patient currently own DME?: No  Does patient have a history of Outpatient Therapy (PT/OT)?: No  Does the patient have a history of Short-Term Rehab?: No  Does patient have a history of HHC?: No  Does patient currently have HHC?:  No         Patient Information Continued  Income Source: Unemployed  Does patient have prescription coverage?: No  Does patient receive dialysis treatments?: No  Does patient have a history of substance abuse?: Yes  Historical substance use preference: Fentanyl, Heroin  Is patient currently in treatment for substance abuse?: Yes  Does patient have a history of Mental Health Diagnosis?: No    Means of Transportation  Means of Transport to Appts:: Drives Self      Social Determinants of Health (SDOH)      Flowsheet Row Most Recent Value   Housing Stability    In the last 12 months, was there a time when you were not able to pay the mortgage or rent on time? Pt Declined   In the last 12 months, was there a time when you did not have a steady place to sleep or slept in a shelter (including now)? Pt Declined   Transportation Needs    In the past 12 months, has lack of transportation kept you from medical appointments or from getting medications? Pt Declined   In the past 12 months, has lack of transportation kept you from meetings, work, or from getting things needed for daily living? Pt Declined   Food Insecurity    Within the past 12 months, you worried that your food would run out before you got the money to buy more. Pt Declined   Within the past 12 months, the food you bought just didn't last and you didn't have money to get more. Pt Declined   Utilities    In the past 12 months has the electric, gas, oil, or water company threatened to shut off services in your home? Pt Declined            DISCHARGE DETAILS:    Additional Comments: Met with Pt. Pt presenta alert and oriented. Pt with limited information. Pt reports he has been at [facility] for 3 days prior to coming to hospital. Pt reports he lives alone. Independent PTA. Declines information for PCP. Pt does not have healthcare insurance and agreeable for referral to [facility]. Pt denies hx of inpt MH.Pt reports hx of legal issues. Pt reports his plan is to  return home and does not want to [facility] upon discharge. Referral sent to CURTIS MORRELL to follow.

## 2024-02-29 NOTE — PROGRESS NOTES
[facility]  Progress Note  Name: Eh Chauhan I  MRN: [id number]  Unit/Bed#: -01 SDU I Date of Admission: 2/23/2024   Date of Service: 2/29/2024 I Hospital Day: 6    Assessment/Plan   Fracture of right calcaneus  Assessment & Plan  See mgx above    Abnormal urinalysis  Assessment & Plan  UA showing innumerable bacteria   UC showing no growth    Sepsis without acute organ dysfunction (HCC)  Assessment & Plan  See management above    Opioid use disorder  Assessment & Plan  Comes from [facility] for where he has been undergoing opioid detox  Discussed with toxicology who was consulted and recommended:  Continue suboxone 8 mg bid to started the evening of 2/23    Discussed case with toxicology again will give 1x dose of ativan   UDS + benzos  Placed on valium prn  Started on clonidine patch  Denies any other drug use        * Sepsis due to pneumonia (HCC)  Assessment & Plan  Pt presenting d/t worsening right LE pain and possible infection    Initially presented on 1/3/24 where he was found to have a calcaneal fracture after jumping over a fence near [facility] for which he was to follow-up with Ortho however was unable to follow-up    On 2/21/2024 had further pain and was told that there was a possible infection and placed on antibiotics. Pt failed abx and was told to come to the ED for further evaluation    Pt meeting sepsis criteria d/t tachypnea and leukocytosis of 17.58 with a source being osteomyelitis  Patient was given Vanco and cefepime and 1 L of fluids in the ED  Lactic acid 2.1  ESR 69  CRP 10  blood cultures NGTD   On Rocephin and Zithromax  Dc'd vanc d/t possible allergic reaction-> continue to montior off of vanc   MRSA culture is positive  MRI showing no osteo at this time  CT c/a/pelvis-- Scattered small groundglass opacities in the right lower greater than upper lobes, suggesting atypical infection.2. Mild circumferential urinary bladder wall thickening. Correlate  "with urinalysis.3. Skin thickening/edema at the base of the penis/upper scrotum, suggesting cellulitis. Recommend direct inspection. Both testes located in the distal inguinal canals, suggesting cryptorchidism.4. Indeterminate 1.3 cm hypodense right hepatic lobe lesion. Outpatient MRI abdomen with and without contrast can be obtained for further evaluation.\"  C diff negative  Echo ejection fraction of 55% with normal wall motion.  Toradol for severe pain  Trend WBC and fever curve             Labs & Imaging: I have personally reviewed pertinent reports.      VTE Prophylaxis: in place.    Code Status:   Level 1 - Full Code    Patient Centered Rounds: I have performed bedside rounds with nursing staff today.    Mobility:   Basic Mobility Inpatient Raw Score: 22  JH-HLM Goal: 7: Walk 25 feet or more  JH-HLM Achieved: 8: Walk 250 feet ot more  HLM Goal NOT achieved. Continue with multidisciplinary rounding and encourage appropriate mobility to improve upon HLM goals.    Discussions with Specialists or Other Care Team Provider: RN    Education and Discussions with Family / Patient: Aunt Teresa    Total Time Spent on Date of Encounter in care of patient: 35 mins. This time was spent on one or more of the following: performing physical exam; counseling and coordination of care; obtaining or reviewing history; documenting in the medical record; reviewing/ordering tests, medications or procedures; communicating with other healthcare professionals and discussing with patient's family/caregivers.    Current Length of Stay: 6 day(s)    Current Patient Status: Inpatient   Certification Statement: The patient will continue to require additional inpatient hospital stay due to see my assessment and plan.     Subjective:   Patient is seen and examined at bedside.  No new complaints.  Patient had withdrawal symptoms yesterday afternoon but is stable this morning.  Afebrile  All other ROS are negative.    Objective:    Vitals: Blood " "pressure 142/76, pulse 63, temperature 97.8 °F (36.6 °C), temperature source Oral, resp. rate 16, height 5' 11\" (1.803 m), weight 76.4 kg (168 lb 6.9 oz), SpO2 97%.,Body mass index is 23.49 kg/m².  SPO2 RA Rest      Flowsheet Row ED to Hosp-Admission (Current) from 2/23/2024 in [facility]   SpO2 97 %   SpO2 Activity At Rest   O2 Device None (Room air)   O2 Flow Rate --          I&O:   Intake/Output Summary (Last 24 hours) at 2/29/2024 0900  Last data filed at 2/29/2024 0841  Gross per 24 hour   Intake 3018.75 ml   Output 3275 ml   Net -256.25 ml       Physical Exam:    General- Alert, lying comfortably in bed. Not in any acute distress.  Neck- Supple, No JVD  CVS- regular, S1 and S2 normal  Chest- Bilateral Air entry, No rhochi, crackles or wheezing present.  Abdomen- soft, nontender, not distended, no guarding or rigidity, BS+  Extremities-  No pedal edema, No calf tenderness                         Normal ROM in all extremities.  CNS-   Alert, awake and orientedx3. No focal deficits present.    Invasive Devices       Peripheral Intravenous Line  Duration             Peripheral IV 02/29/24 Distal;Left;Upper;Ventral (anterior) Arm <1 day                          Social History  reviewed  History reviewed. No pertinent family history. reviewed    Meds:  Current Facility-Administered Medications   Medication Dose Route Frequency Provider Last Rate Last Admin    aluminum-magnesium hydroxide-simethicone (MAALOX) oral suspension 30 mL  30 mL Oral Q6H PRN Amelia Manzano MD        azithromycin (ZITHROMAX) 500 mg in sodium chloride 0.9% 250mL IVPB 500 mg  500 mg Intravenous Q24H Amelia Manzano MD   Stopped at 02/28/24 1401    benzonatate (TESSALON PERLES) capsule 100 mg  100 mg Oral TID PRN Amelia Manzano MD   100 mg at 02/26/24 1313    buprenorphine-naloxone (Suboxone) film 8 mg  8 mg Sublingual BID Amelia Manzano MD   8 mg at 02/29/24 0836    cefTRIAXone (ROCEPHIN) IVPB (premix in " dextrose) 1,000 mg 50 mL  1,000 mg Intravenous Q24H Kuldeep Sanchez MD   Stopped at 02/28/24 1225    cloNIDine (CATAPRES) tablet 0.1 mg  0.1 mg Oral Q12H PRN Kuldeep Sanchez MD   0.1 mg at 02/29/24 0311    cloNIDine (CATAPRES-TTS-1) 0.1 mg/24 hr TD weekly patch  0.1 mg Transdermal Weekly Kuldeep Sanchez MD   0.1 mg at 02/28/24 1557    diazepam (VALIUM) tablet 5 mg  5 mg Oral Q6H PRN Kuldeep Sanchez MD   5 mg at 02/29/24 0449    guaiFENesin (MUCINEX) 12 hr tablet 600 mg  600 mg Oral Q12H MEGHAN Amelia Manzano MD   600 mg at 02/29/24 0836    ibuprofen (MOTRIN) tablet 400 mg  400 mg Oral Q6H PRN Amelia Manzano MD   400 mg at 02/28/24 1934    melatonin tablet 6 mg  6 mg Oral HS Nery Wayne PA-C   6 mg at 02/28/24 2101    metoclopramide (REGLAN) injection 10 mg  10 mg Intravenous Q6H PRN Amelia Manzano MD   10 mg at 02/27/24 1003    polyethylene glycol (MIRALAX) packet 17 g  17 g Oral Daily PRN Amelia Manzano MD        potassium chloride 20 mEq IVPB (premix)  20 mEq Intravenous TID Amelia Manzano MD 50 mL/hr at 02/29/24 0836 20 mEq at 02/29/24 0836    sodium chloride 0.9 % bolus 1,000 mL  1,000 mL Intravenous Once Amelia Manzano MD 1,000 mL/hr at 02/23/24 2354 Restarted at 02/23/24 2354    sodium chloride 0.9 % infusion  75 mL/hr Intravenous Continuous Kuldeep Sanchez MD 75 mL/hr at 02/29/24 0226 75 mL/hr at 02/29/24 0226    trimethobenzamide (TIGAN) IM injection 200 mg  200 mg Intramuscular Q6H PRN Bhavana Reed PA-C   200 mg at 02/24/24 2354      No medications prior to admission.       Labs:  Results from last 7 days   Lab Units 02/29/24  0522 02/28/24  0502 02/27/24  0432   WBC Thousand/uL 9.93 10.64* 8.77   HEMOGLOBIN g/dL 11.7* 14.3 13.3   HEMATOCRIT % 36.7 45.8 41.7   PLATELETS Thousands/uL 309 360 357   NEUTROS PCT % 60 59 59   LYMPHS PCT % 27 28 28   MONOS PCT % 6 7 8   EOS PCT % 5 4 3     Results from last 7 days   Lab Units 02/29/24  0522 02/28/24  0502 02/27/24  0432 02/26/24  0632  "02/25/24 2126 02/25/24  0234   POTASSIUM mmol/L 3.0* 3.8 3.6 3.6   < > 3.4*   CHLORIDE mmol/L 114* 106 106 105   < > 104   CO2 mmol/L 23 23 25 26   < > 27   BUN mg/dL 8 8 11 9   < > 14   CREATININE mg/dL 0.42* 0.54* 0.56* 0.57*   < > 0.65   CALCIUM mg/dL 7.9* 9.6 9.5 9.0   < > 9.0   ALK PHOS U/L  --   --  75 75  --  81   ALT U/L  --   --  13 13  --  16   AST U/L  --   --  13 13  --  15    < > = values in this interval not displayed.     No results found for: \"TROPONINI\", \"CKMB\", \"CKTOTAL\"      Lab Results   Component Value Date    BLOODCX No Growth After 4 Days. 02/23/2024    BLOODCX No Growth After 4 Days. 02/23/2024    URINECX No Growth <1000 cfu/mL 02/23/2024         Imaging:  Results for orders placed during the hospital encounter of 02/23/24    XR chest 1 view portable    Narrative  XR CHEST PORTABLE    INDICATION: fever. Shortness of breath.    COMPARISON: None    FINDINGS:    Clear lungs. No pneumothorax or pleural effusion.    Normal cardiomediastinal silhouette.    Bones are unremarkable for age.    Normal upper abdomen.    Impression  No acute cardiopulmonary disease.        Workstation performed: [id number]    No results found for this or any previous visit.      Last 24 Hours Medication List:   Current Facility-Administered Medications   Medication Dose Route Frequency Provider Last Rate    aluminum-magnesium hydroxide-simethicone  30 mL Oral Q6H PRN Amelia Manzano MD      azithromycin  500 mg Intravenous Q24H Amelia Manzano MD Stopped (02/28/24 1401)    benzonatate  100 mg Oral TID PRN Amelia Manzano MD      buprenorphine-naloxone  8 mg Sublingual BID Amelia Manzano MD      cefTRIAXone  1,000 mg Intravenous Q24H Kuldeep Sanchez MD Stopped (02/28/24 1225)    cloNIDine  0.1 mg Oral Q12H PRN Kuldeep Sanchez MD      cloNIDine  0.1 mg Transdermal Weekly Kuldeep Sanchez MD      diazepam  5 mg Oral Q6H PRN Kuldeep Sanchez MD      guaiFENesin  600 mg Oral Q12H MEGHAN Amelia Manzano MD      ibuprofen  " 400 mg Oral Q6H PRN Amelia Manzano MD      melatonin  6 mg Oral HS Nery Wayne PA-C      metoclopramide  10 mg Intravenous Q6H PRN Amelia Manzano MD      polyethylene glycol  17 g Oral Daily PRN Amelia Manzano MD      potassium chloride  20 mEq Intravenous TID Amelia Manzano MD 20 mEq (02/29/24 0836)    sodium chloride  1,000 mL Intravenous Once Amelia Manzano MD 1,000 mL/hr at 02/23/24 2354    sodium chloride  75 mL/hr Intravenous Continuous Kuldeep Sanchez MD 75 mL/hr (02/29/24 0226)    trimethobenzamide  200 mg Intramuscular Q6H PRN Bhavana Reed PA-C          Today, Patient Was Seen By: Kuldeep Sanchez MD    ** Please Note: Dictation voice to text software may have been used in the creation of this document. **

## 2024-02-29 NOTE — PLAN OF CARE
Problem: Potential for Falls  Goal: Patient will remain free of falls  Description: INTERVENTIONS:  - Educate patient/family on patient safety including physical limitations  - Instruct patient to call for assistance with activity   - Consult OT/PT to assist with strengthening/mobility   - Keep Call bell within reach  - Keep bed low and locked with side rails adjusted as appropriate  - Keep care items and personal belongings within reach  - Initiate and maintain comfort rounds  - Make Fall Risk Sign visible to staff  - Offer Toileting every 2 Hours, in advance of need  - Initiate/Maintain bed alarm  - Obtain necessary fall risk management equipment: non slip socks  - Apply yellow socks and bracelet for high fall risk patients  - Consider moving patient to room near nurses station  Outcome: Progressing     Problem: Prexisting or High Potential for Compromised Skin Integrity  Goal: Skin integrity is maintained or improved  Description: INTERVENTIONS:  - Identify patients at risk for skin breakdown  - Assess and monitor skin integrity  - Assess and monitor nutrition and hydration status  - Monitor labs   - Assess for incontinence   - Turn and reposition patient  - Assist with mobility/ambulation  - Relieve pressure over bony prominences  - Avoid friction and shearing  - Provide appropriate hygiene as needed including keeping skin clean and dry  - Evaluate need for skin moisturizer/barrier cream  - Collaborate with interdisciplinary team   - Patient/family teaching  - Consider wound care consult   Outcome: Progressing     Problem: PAIN - ADULT  Goal: Verbalizes/displays adequate comfort level or baseline comfort level  Description: Interventions:  - Encourage patient to monitor pain and request assistance  - Assess pain using appropriate pain scale  - Administer analgesics based on type and severity of pain and evaluate response  - Implement non-pharmacological measures as appropriate and evaluate response  - Consider  cultural and social influences on pain and pain management  - Notify physician/advanced practitioner if interventions unsuccessful or patient reports new pain  Outcome: Progressing     Problem: INFECTION - ADULT  Goal: Absence or prevention of progression during hospitalization  Description: INTERVENTIONS:  - Assess and monitor for signs and symptoms of infection  - Monitor lab/diagnostic results  - Monitor all insertion sites, i.e. indwelling lines, tubes, and drains  - Monitor endotracheal if appropriate and nasal secretions for changes in amount and color  - [place] appropriate cooling/warming therapies per order  - Administer medications as ordered  - Instruct and encourage patient and family to use good hand hygiene technique  - Identify and instruct in appropriate isolation precautions for identified infection/condition  Outcome: Progressing  Goal: Absence of fever/infection during neutropenic period  Description: INTERVENTIONS:  - Monitor WBC    Outcome: Progressing     Problem: SAFETY ADULT  Goal: Patient will remain free of falls  Description: INTERVENTIONS:  - Educate patient/family on patient safety including physical limitations  - Instruct patient to call for assistance with activity   - Consult OT/PT to assist with strengthening/mobility   - Keep Call bell within reach  - Keep bed low and locked with side rails adjusted as appropriate  - Keep care items and personal belongings within reach  - Initiate and maintain comfort rounds  - Make Fall Risk Sign visible to staff  - Offer Toileting every 2 Hours, in advance of need  - Initiate/Maintain bed alarm  - Obtain necessary fall risk management equipment: non slip socks  - Apply yellow socks and bracelet for high fall risk patients  - Consider moving patient to room near nurses station  Outcome: Progressing  Goal: Maintain or return to baseline ADL function  Description: INTERVENTIONS:  - Educate patient/family on patient safety including physical  limitations  - Instruct patient to call for assistance with activity   - Consult OT/PT to assist with strengthening/mobility   - Keep Call bell within reach  - Keep bed low and locked with side rails adjusted as appropriate  - Keep care items and personal belongings within reach  - Initiate and maintain comfort rounds  - Make Fall Risk Sign visible to staff  - Offer Toileting every 2 Hours, in advance of need  - Initiate/Maintain bed alarm  - Obtain necessary fall risk management equipment: non slip socks  - Apply yellow socks and bracelet for high fall risk patients  - Consider moving patient to room near nurses station  Outcome: Progressing  Goal: Maintains/Returns to pre admission functional level  Description: INTERVENTIONS:  - Perform AM-PAC 6 Click Basic Mobility/ Daily Activity assessment daily.  - Set and communicate daily mobility goal to care team and patient/family/caregiver.   - Collaborate with rehabilitation services on mobility goals if consulted  - Out of bed for toileting  - Record patient progress and toleration of activity level   Outcome: Progressing     Problem: DISCHARGE PLANNING  Goal: Discharge to home or other facility with appropriate resources  Description: INTERVENTIONS:  - Identify barriers to discharge w/patient and caregiver  - Arrange for needed discharge resources and transportation as appropriate  - Identify discharge learning needs (meds, wound care, etc.)  - Arrange for interpretive services to assist at discharge as needed  - Refer to Case Management Department for coordinating discharge planning if the patient needs post-hospital services based on physician/advanced practitioner order or complex needs related to functional status, cognitive ability, or social support system  Outcome: Progressing     Problem: Knowledge Deficit  Goal: Patient/family/caregiver demonstrates understanding of disease process, treatment plan, medications, and discharge instructions  Description:  Complete learning assessment and assess knowledge base.  Interventions:  - Provide teaching at level of understanding  - Provide teaching via preferred learning methods  Outcome: Progressing     Problem: Nutrition/Hydration-ADULT  Goal: Nutrient/Hydration intake appropriate for improving, restoring or maintaining nutritional needs  Description: Monitor and assess patient's nutrition/hydration status for malnutrition. Collaborate with interdisciplinary team and initiate plan and interventions as ordered.  Monitor patient's weight and dietary intake as ordered or per policy. Utilize nutrition screening tool and intervene as necessary. Determine patient's food preferences and provide high-protein, high-caloric foods as appropriate.     INTERVENTIONS:  - Monitor oral intake, urinary output, labs, and treatment plans  - Assess nutrition and hydration status and recommend course of action  - Evaluate amount of meals eaten  - Assist patient with eating if necessary   - Allow adequate time for meals  - Recommend/ encourage appropriate diets, oral nutritional supplements, and vitamin/mineral supplements  - Order, calculate, and assess calorie counts as needed  - Recommend, monitor, and adjust tube feedings and TPN/PPN based on assessed needs  - Assess need for intravenous fluids  - Provide specific nutrition/hydration education as appropriate  - Include patient/family/caregiver in decisions related to nutrition  Outcome: Progressing     Problem: SUBSTANCE USE/ABUSE  Goal: Will have no detox symptoms and will verbalize plan for changing substance-related behavior  Description: INTERVENTIONS:  - Monitor physical status and assess for symptoms of withdrawal  - Administer medication as ordered  - Provide emotional support with 1 on 1 interaction with staff  - Encourage recovery focused program/ addiction education  - Assess for verbalization of changing behaviors related to substance abuse  - Initiate consults and referrals as  appropriate (Case Management, Spiritual Care, etc.)  Outcome: Progressing  Goal: By discharge, will develop insight into their chemical dependency and sustain motivation to continue in recovery  Description: INTERVENTIONS:  - Attends all daily group sessions and scheduled AA groups  - Actively practices coping skills through participation in the therapeutic community and adherence to program rules  - Reviews and completes assignments from individual treatment plan  - Assist patient development of understanding of their personal cycle of addiction and relapse triggers  Outcome: Progressing

## 2024-02-29 NOTE — ASSESSMENT & PLAN NOTE
"Pt presenting d/t worsening right LE pain and possible infection    Initially presented on 1/3/24 where he was found to have a calcaneal fracture after jumping over a fence near [facility] for which he was to follow-up with Ortho however was unable to follow-up    On 2/21/2024 had further pain and was told that there was a possible infection and placed on antibiotics. Pt failed abx and was told to come to the ED for further evaluation    Pt meeting sepsis criteria d/t tachypnea and leukocytosis of 17.58 with a source being osteomyelitis  Patient was given Vanco and cefepime and 1 L of fluids in the ED  Lactic acid 2.1  ESR 69  CRP 10  blood cultures NGTD   On Rocephin and Zithromax  Dc'd vanc d/t possible allergic reaction-> continue to montior off of vanc   MRSA culture is positive  MRI showing no osteo at this time  CT c/a/pelvis-- Scattered small groundglass opacities in the right lower greater than upper lobes, suggesting atypical infection.2. Mild circumferential urinary bladder wall thickening. Correlate with urinalysis.3. Skin thickening/edema at the base of the penis/upper scrotum, suggesting cellulitis. Recommend direct inspection. Both testes located in the distal inguinal canals, suggesting cryptorchidism.4. Indeterminate 1.3 cm hypodense right hepatic lobe lesion. Outpatient MRI abdomen with and without contrast can be obtained for further evaluation.\"  C diff negative  Echo ejection fraction of 55% with normal wall motion.  Toradol for severe pain  Trend WBC and fever curve  "

## 2024-03-01 LAB
ANION GAP SERPL CALCULATED.3IONS-SCNC: 8 MMOL/L
ATRIAL RATE: 117 BPM
BACTERIA SPT RESP CULT: ABNORMAL
BASOPHILS # BLD AUTO: 0.05 THOUSANDS/ÂΜL (ref 0–0.1)
BASOPHILS NFR BLD AUTO: 0 % (ref 0–1)
BUN SERPL-MCNC: 9 MG/DL (ref 5–25)
CALCIUM SERPL-MCNC: 9.8 MG/DL (ref 8.4–10.2)
CHLORIDE SERPL-SCNC: 107 MMOL/L (ref 96–108)
CO2 SERPL-SCNC: 24 MMOL/L (ref 21–32)
CREAT SERPL-MCNC: 0.48 MG/DL (ref 0.6–1.3)
EOSINOPHIL # BLD AUTO: 0.5 THOUSAND/ÂΜL (ref 0–0.61)
EOSINOPHIL NFR BLD AUTO: 4 % (ref 0–6)
ERYTHROCYTE [DISTWIDTH] IN BLOOD BY AUTOMATED COUNT: 13.1 % (ref 11.6–15.1)
GFR SERPL CREATININE-BSD FRML MDRD: 146 ML/MIN/1.73SQ M
GLUCOSE SERPL-MCNC: 104 MG/DL (ref 65–140)
GRAM STN SPEC: ABNORMAL
HCT VFR BLD AUTO: 42.2 % (ref 36.5–49.3)
HGB BLD-MCNC: 13.3 G/DL (ref 12–17)
IMM GRANULOCYTES # BLD AUTO: 0.17 THOUSAND/UL (ref 0–0.2)
IMM GRANULOCYTES NFR BLD AUTO: 1 % (ref 0–2)
LYMPHOCYTES # BLD AUTO: 3.05 THOUSANDS/ÂΜL (ref 0.6–4.47)
LYMPHOCYTES NFR BLD AUTO: 25 % (ref 14–44)
MCH RBC QN AUTO: 27.9 PG (ref 26.8–34.3)
MCHC RBC AUTO-ENTMCNC: 31.5 G/DL (ref 31.4–37.4)
MCV RBC AUTO: 89 FL (ref 82–98)
MONOCYTES # BLD AUTO: 0.88 THOUSAND/ÂΜL (ref 0.17–1.22)
MONOCYTES NFR BLD AUTO: 7 % (ref 4–12)
NEUTROPHILS # BLD AUTO: 7.78 THOUSANDS/ÂΜL (ref 1.85–7.62)
NEUTS SEG NFR BLD AUTO: 63 % (ref 43–75)
NRBC BLD AUTO-RTO: 0 /100 WBCS
P AXIS: 65 DEGREES
PLATELET # BLD AUTO: 362 THOUSANDS/UL (ref 149–390)
PMV BLD AUTO: 9.4 FL (ref 8.9–12.7)
POTASSIUM SERPL-SCNC: 3.9 MMOL/L (ref 3.5–5.3)
PR INTERVAL: 128 MS
QRS AXIS: 0 DEGREES
QRSD INTERVAL: 74 MS
QT INTERVAL: 328 MS
QTC INTERVAL: 457 MS
RBC # BLD AUTO: 4.76 MILLION/UL (ref 3.88–5.62)
SODIUM SERPL-SCNC: 139 MMOL/L (ref 135–147)
T WAVE AXIS: 36 DEGREES
VENTRICULAR RATE: 117 BPM
WBC # BLD AUTO: 12.43 THOUSAND/UL (ref 4.31–10.16)

## 2024-03-01 PROCEDURE — 80048 BASIC METABOLIC PNL TOTAL CA: CPT | Performed by: INTERNAL MEDICINE

## 2024-03-01 PROCEDURE — 85025 COMPLETE CBC W/AUTO DIFF WBC: CPT | Performed by: INTERNAL MEDICINE

## 2024-03-01 PROCEDURE — 93010 ELECTROCARDIOGRAM REPORT: CPT | Performed by: INTERNAL MEDICINE

## 2024-03-01 PROCEDURE — 99232 SBSQ HOSP IP/OBS MODERATE 35: CPT | Performed by: INTERNAL MEDICINE

## 2024-03-01 RX ORDER — DOXYCYCLINE 100 MG/1
100 TABLET ORAL 2 TIMES DAILY
COMMUNITY
End: 2024-03-02

## 2024-03-01 RX ORDER — GABAPENTIN 100 MG/1
100 CAPSULE ORAL 3 TIMES DAILY
COMMUNITY

## 2024-03-01 RX ORDER — BUPRENORPHINE AND NALOXONE 8; 2 MG/1; MG/1
16 FILM, SOLUBLE BUCCAL; SUBLINGUAL DAILY
COMMUNITY

## 2024-03-01 RX ORDER — DOXYCYCLINE HYCLATE 100 MG/1
100 CAPSULE ORAL EVERY 12 HOURS SCHEDULED
Status: DISCONTINUED | OUTPATIENT
Start: 2024-03-01 | End: 2024-03-02 | Stop reason: HOSPADM

## 2024-03-01 RX ADMIN — CLONIDINE HYDROCHLORIDE 0.1 MG: 0.1 TABLET ORAL at 15:37

## 2024-03-01 RX ADMIN — CEFTRIAXONE 1000 MG: 1 INJECTION, SOLUTION INTRAVENOUS at 11:16

## 2024-03-01 RX ADMIN — METOCLOPRAMIDE 10 MG: 5 INJECTION, SOLUTION INTRAMUSCULAR; INTRAVENOUS at 01:58

## 2024-03-01 RX ADMIN — DIAZEPAM 5 MG: 5 TABLET ORAL at 14:01

## 2024-03-01 RX ADMIN — GUAIFENESIN 600 MG: 600 TABLET ORAL at 08:05

## 2024-03-01 RX ADMIN — DOXYCYCLINE 100 MG: 100 CAPSULE ORAL at 11:16

## 2024-03-01 RX ADMIN — BUPRENORPHINE AND NALOXONE 8 MG: 8; 2 FILM BUCCAL; SUBLINGUAL at 08:05

## 2024-03-01 RX ADMIN — SODIUM CHLORIDE 75 ML/HR: 0.9 INJECTION, SOLUTION INTRAVENOUS at 05:41

## 2024-03-01 RX ADMIN — BUPRENORPHINE AND NALOXONE 8 MG: 8; 2 FILM BUCCAL; SUBLINGUAL at 17:39

## 2024-03-01 RX ADMIN — Medication 6 MG: at 20:30

## 2024-03-01 RX ADMIN — DIAZEPAM 5 MG: 5 TABLET ORAL at 07:40

## 2024-03-01 RX ADMIN — IBUPROFEN 400 MG: 400 TABLET, FILM COATED ORAL at 11:15

## 2024-03-01 RX ADMIN — DOXYCYCLINE 100 MG: 100 CAPSULE ORAL at 20:30

## 2024-03-01 RX ADMIN — GUAIFENESIN 600 MG: 600 TABLET ORAL at 20:31

## 2024-03-01 RX ADMIN — DIAZEPAM 5 MG: 5 TABLET ORAL at 20:31

## 2024-03-01 RX ADMIN — IBUPROFEN 400 MG: 400 TABLET, FILM COATED ORAL at 05:39

## 2024-03-01 NOTE — ASSESSMENT & PLAN NOTE
Comes from [facility] for where he has been undergoing opioid detox  Discussed with toxicology who was consulted and recommended:  Continue suboxone 8 mg bid to started the evening of 2/23    Discussed case with toxicology again will give 1x dose of ativan   UDS + benzos  Placed on valium prn  Started on clonidine patch  Denies any other drug use

## 2024-03-01 NOTE — CASE MANAGEMENT
Case Management Progress Note    Patient name Eh Chauhan  Location  SDU/-01 S* MRN [id number]  : 1990 Date 3/1/2024       LOS (days): 7  Geometric Mean LOS (GMLOS) (days):   Days to GMLOS:        OBJECTIVE:        Current admission status: Inpatient  Preferred Pharmacy:   [facility] [address] [place] [address]  [address] [facility] [address]  Phone: [phone] Fax: [phone]    Primary Care Provider: No primary care provider on file.    Primary Insurance:   Secondary Insurance:     PROGRESS NOTE:  Information for [facility] outpt information given to Pt.Pt in agreement for information.

## 2024-03-01 NOTE — PLAN OF CARE
Problem: Potential for Falls  Goal: Patient will remain free of falls  Description: INTERVENTIONS:  - Educate patient/family on patient safety including physical limitations  - Instruct patient to call for assistance with activity   - Consult OT/PT to assist with strengthening/mobility   - Keep Call bell within reach  - Keep bed low and locked with side rails adjusted as appropriate  - Keep care items and personal belongings within reach  - Initiate and maintain comfort rounds  - Make Fall Risk Sign visible to staff  - Offer Toileting every 2 Hours, in advance of need  - Initiate/Maintain bed alarm  - Obtain necessary fall risk management equipment: non slip socks  - Apply yellow socks and bracelet for high fall risk patients  - Consider moving patient to room near nurses station  Outcome: Progressing     Problem: Prexisting or High Potential for Compromised Skin Integrity  Goal: Skin integrity is maintained or improved  Description: INTERVENTIONS:  - Identify patients at risk for skin breakdown  - Assess and monitor skin integrity  - Assess and monitor nutrition and hydration status  - Monitor labs   - Assess for incontinence   - Turn and reposition patient  - Assist with mobility/ambulation  - Relieve pressure over bony prominences  - Avoid friction and shearing  - Provide appropriate hygiene as needed including keeping skin clean and dry  - Evaluate need for skin moisturizer/barrier cream  - Collaborate with interdisciplinary team   - Patient/family teaching  - Consider wound care consult   Outcome: Progressing     Problem: PAIN - ADULT  Goal: Verbalizes/displays adequate comfort level or baseline comfort level  Description: Interventions:  - Encourage patient to monitor pain and request assistance  - Assess pain using appropriate pain scale  - Administer analgesics based on type and severity of pain and evaluate response  - Implement non-pharmacological measures as appropriate and evaluate response  - Consider  cultural and social influences on pain and pain management  - Notify physician/advanced practitioner if interventions unsuccessful or patient reports new pain  Outcome: Progressing     Problem: INFECTION - ADULT  Goal: Absence or prevention of progression during hospitalization  Description: INTERVENTIONS:  - Assess and monitor for signs and symptoms of infection  - Monitor lab/diagnostic results  - Monitor all insertion sites, i.e. indwelling lines, tubes, and drains  - Monitor endotracheal if appropriate and nasal secretions for changes in amount and color  - [place] appropriate cooling/warming therapies per order  - Administer medications as ordered  - Instruct and encourage patient and family to use good hand hygiene technique  - Identify and instruct in appropriate isolation precautions for identified infection/condition  Outcome: Progressing  Goal: Absence of fever/infection during neutropenic period  Description: INTERVENTIONS:  - Monitor WBC    Outcome: Progressing     Problem: SAFETY ADULT  Goal: Patient will remain free of falls  Description: INTERVENTIONS:  - Educate patient/family on patient safety including physical limitations  - Instruct patient to call for assistance with activity   - Consult OT/PT to assist with strengthening/mobility   - Keep Call bell within reach  - Keep bed low and locked with side rails adjusted as appropriate  - Keep care items and personal belongings within reach  - Initiate and maintain comfort rounds  - Make Fall Risk Sign visible to staff  - Offer Toileting every 2 Hours, in advance of need  - Initiate/Maintain bed alarm  - Obtain necessary fall risk management equipment: non slip socks  - Apply yellow socks and bracelet for high fall risk patients  - Consider moving patient to room near nurses station  Outcome: Progressing  Goal: Maintain or return to baseline ADL function  Description: INTERVENTIONS:  - Educate patient/family on patient safety including physical  limitations  - Instruct patient to call for assistance with activity   - Consult OT/PT to assist with strengthening/mobility   - Keep Call bell within reach  - Keep bed low and locked with side rails adjusted as appropriate  - Keep care items and personal belongings within reach  - Initiate and maintain comfort rounds  - Make Fall Risk Sign visible to staff  - Offer Toileting every 2 Hours, in advance of need  - Initiate/Maintain bed alarm  - Obtain necessary fall risk management equipment: non slip socks  - Apply yellow socks and bracelet for high fall risk patients  - Consider moving patient to room near nurses station  Outcome: Progressing  Goal: Maintains/Returns to pre admission functional level  Description: INTERVENTIONS:  - Perform AM-PAC 6 Click Basic Mobility/ Daily Activity assessment daily.  - Set and communicate daily mobility goal to care team and patient/family/caregiver.   - Collaborate with rehabilitation services on mobility goals if consulted  - Perform Range of Motion 3 times a day.  - Reposition patient every 2 hours.  - Dangle patient 3 times a day  - Stand patient 3 times a day  - Ambulate patient 3 times a day  - Out of bed to chair 3 times a day   - Out of bed for meals 2 times a day  - Out of bed for toileting  - Record patient progress and toleration of activity level   Outcome: Progressing     Problem: DISCHARGE PLANNING  Goal: Discharge to home or other facility with appropriate resources  Description: INTERVENTIONS:  - Identify barriers to discharge w/patient and caregiver  - Arrange for needed discharge resources and transportation as appropriate  - Identify discharge learning needs (meds, wound care, etc.)  - Arrange for interpretive services to assist at discharge as needed  - Refer to Case Management Department for coordinating discharge planning if the patient needs post-hospital services based on physician/advanced practitioner order or complex needs related to functional status,  cognitive ability, or social support system  Outcome: Progressing     Problem: Knowledge Deficit  Goal: Patient/family/caregiver demonstrates understanding of disease process, treatment plan, medications, and discharge instructions  Description: Complete learning assessment and assess knowledge base.  Interventions:  - Provide teaching at level of understanding  - Provide teaching via preferred learning methods  Outcome: Progressing     Problem: Nutrition/Hydration-ADULT  Goal: Nutrient/Hydration intake appropriate for improving, restoring or maintaining nutritional needs  Description: Monitor and assess patient's nutrition/hydration status for malnutrition. Collaborate with interdisciplinary team and initiate plan and interventions as ordered.  Monitor patient's weight and dietary intake as ordered or per policy. Utilize nutrition screening tool and intervene as necessary. Determine patient's food preferences and provide high-protein, high-caloric foods as appropriate.     INTERVENTIONS:  - Monitor oral intake, urinary output, labs, and treatment plans  - Assess nutrition and hydration status and recommend course of action  - Evaluate amount of meals eaten  - Assist patient with eating if necessary   - Allow adequate time for meals  - Recommend/ encourage appropriate diets, oral nutritional supplements, and vitamin/mineral supplements  - Order, calculate, and assess calorie counts as needed  - Recommend, monitor, and adjust tube feedings and TPN/PPN based on assessed needs  - Assess need for intravenous fluids  - Provide specific nutrition/hydration education as appropriate  - Include patient/family/caregiver in decisions related to nutrition  Outcome: Progressing     Problem: SUBSTANCE USE/ABUSE  Goal: Will have no detox symptoms and will verbalize plan for changing substance-related behavior  Description: INTERVENTIONS:  - Monitor physical status and assess for symptoms of withdrawal  - Administer medication as  ordered  - Provide emotional support with 1 on 1 interaction with staff  - Encourage recovery focused program/ addiction education  - Assess for verbalization of changing behaviors related to substance abuse  - Initiate consults and referrals as appropriate (Case Management, Spiritual Care, etc.)  Outcome: Progressing  Goal: By discharge, will develop insight into their chemical dependency and sustain motivation to continue in recovery  Description: INTERVENTIONS:  - Attends all daily group sessions and scheduled AA groups  - Actively practices coping skills through participation in the therapeutic community and adherence to program rules  - Reviews and completes assignments from individual treatment plan  - Assist patient development of understanding of their personal cycle of addiction and relapse triggers  Outcome: Progressing

## 2024-03-01 NOTE — PROGRESS NOTES
[facility]  Progress Note  Name: Eh Chauhan I  MRN: [id number]  Unit/Bed#: -01 SDU I Date of Admission: 2/23/2024   Date of Service: 3/1/2024 I Hospital Day: 7    Assessment/Plan   Fracture of right calcaneus  Assessment & Plan  See mgx above    Abnormal urinalysis  Assessment & Plan  UA showing innumerable bacteria   UC showing no growth    Sepsis without acute organ dysfunction (HCC)  Assessment & Plan  See management above    Opioid use disorder  Assessment & Plan  Comes from [facility] for where he has been undergoing opioid detox  Discussed with toxicology who was consulted and recommended:  Continue suboxone 8 mg bid to started the evening of 2/23    Discussed case with toxicology again will give 1x dose of ativan   UDS + benzos  Placed on valium prn  Started on clonidine patch  Denies any other drug use        * Sepsis due to pneumonia (HCC)  Assessment & Plan  Pt presenting d/t worsening right LE pain and possible infection    Initially presented on 1/3/24 where he was found to have a calcaneal fracture after jumping over a fence near [facility] for which he was to follow-up with Ortho however was unable to follow-up    On 2/21/2024 had further pain and was told that there was a possible infection and placed on antibiotics. Pt failed abx and was told to come to the ED for further evaluation    Pt meeting sepsis criteria d/t tachypnea and leukocytosis of 17.58 with a source being osteomyelitis  Patient was given Vanco and cefepime and 1 L of fluids in the ED  Lactic acid 2.1  ESR 69  CRP 10  blood cultures NGTD   MRSA culture is positive  MRI showing no osteo at this time  CT c/a/pelvis-- Scattered small groundglass opacities in the right lower greater than upper lobes, suggesting atypical infection.2. Mild circumferential urinary bladder wall thickening. Correlate with urinalysis.3. Skin thickening/edema at the base of the penis/upper scrotum, suggesting cellulitis.  "Recommend direct inspection. Both testes located in the distal inguinal canals, suggesting cryptorchidism.4. Indeterminate 1.3 cm hypodense right hepatic lobe lesion. Outpatient MRI abdomen with and without contrast can be obtained for further evaluation.\"  C diff negative  Sputum culture growing MRSA  On Rocephin and doxycycline  Echo ejection fraction of 55% with normal wall motion.  Toradol for severe pain  Trend WBC and fever curve             Labs & Imaging: I have personally reviewed pertinent reports.      VTE Prophylaxis: in place.    Code Status:   Level 1 - Full Code    Patient Centered Rounds: I have performed bedside rounds with nursing staff today.    Mobility:   Basic Mobility Inpatient Raw Score: 23  JH-HLM Goal: 7: Walk 25 feet or more  JH-HLM Achieved: 7: Walk 25 feet or more  HLM Goal NOT achieved. Continue with multidisciplinary rounding and encourage appropriate mobility to improve upon HLM goals.    Discussions with Specialists or Other Care Team Provider: CM    Education and Discussions with Family / Patient: Aunt Teresa    Total Time Spent on Date of Encounter in care of patient: 35 mins. This time was spent on one or more of the following: performing physical exam; counseling and coordination of care; obtaining or reviewing history; documenting in the medical record; reviewing/ordering tests, medications or procedures; communicating with other healthcare professionals and discussing with patient's family/caregivers.    Current Length of Stay: 7 day(s)    Current Patient Status: Inpatient   Certification Statement: The patient will continue to require additional inpatient hospital stay due to see my assessment and plan.     Subjective:   Patient is seen and examined at bedside.  No new complains. Afebrile  All other ROS are negative.    Objective:    Vitals: Blood pressure 143/88, pulse 85, temperature 98.1 °F (36.7 °C), temperature source Oral, resp. rate 15, height 5' 11\" (1.803 m), weight " 76.4 kg (168 lb 6.9 oz), SpO2 100%.,Body mass index is 23.49 kg/m².  SPO2 RA Rest      Flowsheet Row ED to Hosp-Admission (Current) from 2/23/2024 in [facility] Intensive Care Unit   SpO2 100 %   SpO2 Activity At Rest   O2 Device None (Room air)   O2 Flow Rate --          I&O:   Intake/Output Summary (Last 24 hours) at 3/1/2024 1007  Last data filed at 3/1/2024 0919  Gross per 24 hour   Intake 2122.5 ml   Output 2500 ml   Net -377.5 ml       Physical Exam:    General- Alert, sitting comfortably in bed. Not in any acute distress.  Neck- Supple, No JVD  CVS- regular, S1 and S2 normal  Chest- Bilateral Air entry, No rhochi, crackles or wheezing present.  Abdomen- soft, nontender, not distended, no guarding or rigidity, BS+  Extremities-  No pedal edema, No calf tenderness  CNS-   Alert, awake and orientedx3. No focal deficits present.    Invasive Devices       Peripheral Intravenous Line  Duration             Peripheral IV 02/29/24 Distal;Left;Upper;Ventral (anterior) Arm 1 day                          Social History  reviewed  History reviewed. No pertinent family history. reviewed    Meds:  Current Facility-Administered Medications   Medication Dose Route Frequency Provider Last Rate Last Admin    aluminum-magnesium hydroxide-simethicone (MAALOX) oral suspension 30 mL  30 mL Oral Q6H PRN Amelia Manzano MD        benzonatate (TESSALON PERLES) capsule 100 mg  100 mg Oral TID PRN Amelia Manzano MD   100 mg at 02/26/24 1313    buprenorphine-naloxone (Suboxone) film 8 mg  8 mg Sublingual BID Amelia Manzano MD   8 mg at 03/01/24 0805    cefTRIAXone (ROCEPHIN) IVPB (premix in dextrose) 1,000 mg 50 mL  1,000 mg Intravenous Q24H Kuldeep Sancehz MD   Stopped at 02/29/24 1201    cloNIDine (CATAPRES) tablet 0.1 mg  0.1 mg Oral Q12H PRN Kuldeep Sanchez MD   0.1 mg at 02/29/24 0311    cloNIDine (CATAPRES-TTS-1) 0.1 mg/24 hr TD weekly patch  0.1 mg Transdermal Weekly Kuldeep Sanchez MD   0.1 mg at  02/28/24 1557    diazepam (VALIUM) tablet 5 mg  5 mg Oral Q6H PRN Kuldeep Sanchez MD   5 mg at 03/01/24 0740    doxycycline hyclate (VIBRAMYCIN) capsule 100 mg  100 mg Oral Q12H [facility] Kuldeep Sanchez MD        guaiFENesin (MUCINEX) 12 hr tablet 600 mg  600 mg Oral Q12H [facility] Amelia Manzano MD   600 mg at 03/01/24 0805    ibuprofen (MOTRIN) tablet 400 mg  400 mg Oral Q6H PRN Amelia Manzano MD   400 mg at 03/01/24 0539    melatonin tablet 6 mg  6 mg Oral HS Nery Wayne PA-C   6 mg at 02/29/24 2124    metoclopramide (REGLAN) injection 10 mg  10 mg Intravenous Q6H PRN Amelia Manzano MD   10 mg at 03/01/24 0158    polyethylene glycol (MIRALAX) packet 17 g  17 g Oral Daily PRN Amelia Manzano MD   17 g at 02/29/24 1636    sodium chloride 0.9 % bolus 1,000 mL  1,000 mL Intravenous Once Amelia Manzano MD 1,000 mL/hr at 02/23/24 2354 Restarted at 02/23/24 2354    trimethobenzamide (TIGAN) IM injection 200 mg  200 mg Intramuscular Q6H PRN Bhavana Reed PA-C          No medications prior to admission.       Labs:  Results from last 7 days   Lab Units 03/01/24  0539 02/29/24  0522 02/28/24  0502   WBC Thousand/uL 12.43* 9.93 10.64*   HEMOGLOBIN g/dL 13.3 11.7* 14.3   HEMATOCRIT % 42.2 36.7 45.8   PLATELETS Thousands/uL 362 309 360   NEUTROS PCT % 63 60 59   LYMPHS PCT % 25 27 28   MONOS PCT % 7 6 7   EOS PCT % 4 5 4     Results from last 7 days   Lab Units 03/01/24  0539 02/29/24  0522 02/28/24  0502 02/27/24  0432 02/26/24  0632 02/25/24  2126 02/25/24  0234   POTASSIUM mmol/L 3.9 3.0* 3.8 3.6 3.6   < > 3.4*   CHLORIDE mmol/L 107 114* 106 106 105   < > 104   CO2 mmol/L 24 23 23 25 26   < > 27   BUN mg/dL 9 8 8 11 9   < > 14   CREATININE mg/dL 0.48* 0.42* 0.54* 0.56* 0.57*   < > 0.65   CALCIUM mg/dL 9.8 7.9* 9.6 9.5 9.0   < > 9.0   ALK PHOS U/L  --   --   --  75 75  --  81   ALT U/L  --   --   --  13 13  --  16   AST U/L  --   --   --  13 13  --  15    < > = values in this interval not displayed.     No results  "found for: \"TROPONINI\", \"CKMB\", \"CKTOTAL\"      Lab Results   Component Value Date    BLOODCX No Growth After 4 Days. 02/23/2024    BLOODCX No Growth After 4 Days. 02/23/2024    URINECX No Growth <1000 cfu/mL 02/23/2024    SPUTUMCULTUR (A) 02/27/2024     4+ Growth of Methicillin Resistant Staphylococcus aureus         Imaging:  Results for orders placed during the hospital encounter of 02/23/24    XR chest 1 view portable    Narrative  XR CHEST PORTABLE    INDICATION: fever. Shortness of breath.    COMPARISON: None    FINDINGS:    Clear lungs. No pneumothorax or pleural effusion.    Normal cardiomediastinal silhouette.    Bones are unremarkable for age.    Normal upper abdomen.    Impression  No acute cardiopulmonary disease.        Workstation performed: [id number]    No results found for this or any previous visit.      Last 24 Hours Medication List:   Current Facility-Administered Medications   Medication Dose Route Frequency Provider Last Rate    aluminum-magnesium hydroxide-simethicone  30 mL Oral Q6H PRN Amelia Manzano MD      benzonatate  100 mg Oral TID PRN Amelia Manzano MD      buprenorphine-naloxone  8 mg Sublingual BID Amelia Manzano MD      cefTRIAXone  1,000 mg Intravenous Q24H Kuldeep Sanchez MD Stopped (02/29/24 1201)    cloNIDine  0.1 mg Oral Q12H PRN Kuldeep Sanchez MD      cloNIDine  0.1 mg Transdermal Weekly Kuldeep Sanchez MD      diazepam  5 mg Oral Q6H PRN Kuldeep Sanchez MD      doxycycline hyclate  100 mg Oral Q12H [facility] Kuldeep Sanchez MD      guaiFENesin  600 mg Oral Q12H [facility] Amelia Manzano MD      ibuprofen  400 mg Oral Q6H PRN Amelia Manzano MD      melatonin  6 mg Oral HS Nery Wayne PA-C      metoclopramide  10 mg Intravenous Q6H PRN Amelia Manzano MD      polyethylene glycol  17 g Oral Daily PRN Amelia Manzano MD      sodium chloride  1,000 mL Intravenous Once Amelia Manzano MD 1,000 mL/hr at 02/23/24 3144    trimethobenzamide  200 mg Intramuscular Q6H PRN Bhavana" Carolyn Reed PA-C          Today, Patient Was Seen By: Kuldeep Sanchez MD    ** Please Note: Dictation voice to text software may have been used in the creation of this document. **

## 2024-03-02 VITALS
SYSTOLIC BLOOD PRESSURE: 135 MMHG | BODY MASS INDEX: 23.58 KG/M2 | TEMPERATURE: 98.1 F | DIASTOLIC BLOOD PRESSURE: 89 MMHG | HEART RATE: 94 BPM | HEIGHT: 71 IN | RESPIRATION RATE: 17 BRPM | OXYGEN SATURATION: 100 % | WEIGHT: 168.43 LBS

## 2024-03-02 LAB
ANION GAP SERPL CALCULATED.3IONS-SCNC: 9 MMOL/L
BASOPHILS # BLD AUTO: 0.09 THOUSANDS/ÂΜL (ref 0–0.1)
BASOPHILS NFR BLD AUTO: 1 % (ref 0–1)
BUN SERPL-MCNC: 15 MG/DL (ref 5–25)
CALCIUM SERPL-MCNC: 10.5 MG/DL (ref 8.4–10.2)
CHLORIDE SERPL-SCNC: 105 MMOL/L (ref 96–108)
CO2 SERPL-SCNC: 26 MMOL/L (ref 21–32)
CREAT SERPL-MCNC: 0.66 MG/DL (ref 0.6–1.3)
EOSINOPHIL # BLD AUTO: 0.53 THOUSAND/ÂΜL (ref 0–0.61)
EOSINOPHIL NFR BLD AUTO: 4 % (ref 0–6)
ERYTHROCYTE [DISTWIDTH] IN BLOOD BY AUTOMATED COUNT: 13.5 % (ref 11.6–15.1)
GFR SERPL CREATININE-BSD FRML MDRD: 128 ML/MIN/1.73SQ M
GLUCOSE SERPL-MCNC: 98 MG/DL (ref 65–140)
HCT VFR BLD AUTO: 48.7 % (ref 36.5–49.3)
HGB BLD-MCNC: 15 G/DL (ref 12–17)
IMM GRANULOCYTES # BLD AUTO: 0.21 THOUSAND/UL (ref 0–0.2)
IMM GRANULOCYTES NFR BLD AUTO: 2 % (ref 0–2)
LYMPHOCYTES # BLD AUTO: 4.63 THOUSANDS/ÂΜL (ref 0.6–4.47)
LYMPHOCYTES NFR BLD AUTO: 38 % (ref 14–44)
MCH RBC QN AUTO: 27.5 PG (ref 26.8–34.3)
MCHC RBC AUTO-ENTMCNC: 30.8 G/DL (ref 31.4–37.4)
MCV RBC AUTO: 89 FL (ref 82–98)
MONOCYTES # BLD AUTO: 0.89 THOUSAND/ÂΜL (ref 0.17–1.22)
MONOCYTES NFR BLD AUTO: 7 % (ref 4–12)
NEUTROPHILS # BLD AUTO: 5.72 THOUSANDS/ÂΜL (ref 1.85–7.62)
NEUTS SEG NFR BLD AUTO: 48 % (ref 43–75)
NRBC BLD AUTO-RTO: 0 /100 WBCS
PLATELET # BLD AUTO: 436 THOUSANDS/UL (ref 149–390)
PMV BLD AUTO: 9.6 FL (ref 8.9–12.7)
POTASSIUM SERPL-SCNC: 3.7 MMOL/L (ref 3.5–5.3)
RBC # BLD AUTO: 5.46 MILLION/UL (ref 3.88–5.62)
SODIUM SERPL-SCNC: 140 MMOL/L (ref 135–147)
WBC # BLD AUTO: 12.07 THOUSAND/UL (ref 4.31–10.16)

## 2024-03-02 PROCEDURE — 99239 HOSP IP/OBS DSCHRG MGMT >30: CPT | Performed by: INTERNAL MEDICINE

## 2024-03-02 PROCEDURE — 80048 BASIC METABOLIC PNL TOTAL CA: CPT | Performed by: STUDENT IN AN ORGANIZED HEALTH CARE EDUCATION/TRAINING PROGRAM

## 2024-03-02 PROCEDURE — 85025 COMPLETE CBC W/AUTO DIFF WBC: CPT | Performed by: STUDENT IN AN ORGANIZED HEALTH CARE EDUCATION/TRAINING PROGRAM

## 2024-03-02 RX ORDER — DOXYCYCLINE HYCLATE 100 MG/1
100 CAPSULE ORAL EVERY 12 HOURS SCHEDULED
Qty: 12 CAPSULE | Refills: 0 | Status: SHIPPED | OUTPATIENT
Start: 2024-03-02 | End: 2024-03-08

## 2024-03-02 RX ORDER — DIAZEPAM 5 MG/1
5 TABLET ORAL EVERY 8 HOURS PRN
Qty: 12 TABLET | Refills: 0 | Status: SHIPPED | OUTPATIENT
Start: 2024-03-02 | End: 2024-03-06

## 2024-03-02 RX ADMIN — DIAZEPAM 5 MG: 5 TABLET ORAL at 11:28

## 2024-03-02 RX ADMIN — CEFTRIAXONE 1000 MG: 1 INJECTION, SOLUTION INTRAVENOUS at 11:29

## 2024-03-02 RX ADMIN — BUPRENORPHINE AND NALOXONE 8 MG: 8; 2 FILM BUCCAL; SUBLINGUAL at 09:42

## 2024-03-02 RX ADMIN — GUAIFENESIN 600 MG: 600 TABLET ORAL at 09:43

## 2024-03-02 RX ADMIN — DIAZEPAM 5 MG: 5 TABLET ORAL at 03:49

## 2024-03-02 RX ADMIN — DOXYCYCLINE 100 MG: 100 CAPSULE ORAL at 09:43

## 2024-03-02 RX ADMIN — CLONIDINE HYDROCHLORIDE 0.1 MG: 0.1 TABLET ORAL at 00:05

## 2024-03-02 RX ADMIN — CLONIDINE HYDROCHLORIDE 0.1 MG: 0.1 TABLET ORAL at 11:28

## 2024-03-02 RX ADMIN — IBUPROFEN 400 MG: 400 TABLET, FILM COATED ORAL at 03:55

## 2024-03-02 NOTE — ASSESSMENT & PLAN NOTE
"Pt presenting d/t worsening right LE pain and pneumonia POA    Initially presented on 1/3/24 where he was found to have a calcaneal fracture after jumping over a fence near [facility] for which he was to follow-up with Ortho however was unable to follow-up    On 2/21/2024 had further pain and was told that there was a possible infection and placed on antibiotics. Pt failed abx and was told to come to the ED for further evaluation    Pt meeting sepsis criteria d/t tachypnea and leukocytosis of 17.58 with a source being osteomyelitis  Patient was given Vanco and cefepime and 1 L of fluids in the ED  Lactic acid 2.1  ESR 69  CRP 10  blood cultures NGTD   MRSA culture is positive  MRI showing no osteo at this time  CT c/a/pelvis-- Scattered small groundglass opacities in the right lower greater than upper lobes, suggesting atypical infection.2. Mild circumferential urinary bladder wall thickening. Correlate with urinalysis.3. Skin thickening/edema at the base of the penis/upper scrotum, suggesting cellulitis. Recommend direct inspection. Both testes located in the distal inguinal canals, suggesting cryptorchidism.4. Indeterminate 1.3 cm hypodense right hepatic lobe lesion. Outpatient MRI abdomen with and without contrast can be obtained for further evaluation.\"  Patient was seen by podiatry and and had MRI which showed healing calcaneal fracture with no evidence of osteomyelitis  Podiatry recommended cam boot and crutches and outpatient follow-up with them  C diff negative  Sputum culture growing MRSA  On Rocephin and doxycycline  Echo ejection fraction of 55% with normal wall motion.  Toradol for severe pain  Trend WBC and fever curve  Patient completed the course of Rocephin.  Patient will be discharged on doxycycline to complete the course of treatment for pneumonia  Patient is ambulating and saturating well on room air  "

## 2024-03-02 NOTE — PLAN OF CARE
Problem: Potential for Falls  Goal: Patient will remain free of falls  Description: INTERVENTIONS:  - Educate patient/family on patient safety including physical limitations  - Instruct patient to call for assistance with activity   - Consult OT/PT to assist with strengthening/mobility   - Keep Call bell within reach  - Keep bed low and locked with side rails adjusted as appropriate  - Keep care items and personal belongings within reach  - Initiate and maintain comfort rounds  - Make Fall Risk Sign visible to staff  - Offer Toileting every 2 Hours, in advance of need  - Initiate/Maintain bed alarm  - Obtain necessary fall risk management equipment: non slip socks  - Apply yellow socks and bracelet for high fall risk patients  - Consider moving patient to room near nurses station  Outcome: Progressing     Problem: Prexisting or High Potential for Compromised Skin Integrity  Goal: Skin integrity is maintained or improved  Description: INTERVENTIONS:  - Identify patients at risk for skin breakdown  - Assess and monitor skin integrity  - Assess and monitor nutrition and hydration status  - Monitor labs   - Assess for incontinence   - Turn and reposition patient  - Assist with mobility/ambulation  - Relieve pressure over bony prominences  - Avoid friction and shearing  - Provide appropriate hygiene as needed including keeping skin clean and dry  - Evaluate need for skin moisturizer/barrier cream  - Collaborate with interdisciplinary team   - Patient/family teaching  - Consider wound care consult   Outcome: Progressing     Problem: PAIN - ADULT  Goal: Verbalizes/displays adequate comfort level or baseline comfort level  Description: Interventions:  - Encourage patient to monitor pain and request assistance  - Assess pain using appropriate pain scale  - Administer analgesics based on type and severity of pain and evaluate response  - Implement non-pharmacological measures as appropriate and evaluate response  - Consider  cultural and social influences on pain and pain management  - Notify physician/advanced practitioner if interventions unsuccessful or patient reports new pain  Outcome: Progressing     Problem: INFECTION - ADULT  Goal: Absence or prevention of progression during hospitalization  Description: INTERVENTIONS:  - Assess and monitor for signs and symptoms of infection  - Monitor lab/diagnostic results  - Monitor all insertion sites, i.e. indwelling lines, tubes, and drains  - Monitor endotracheal if appropriate and nasal secretions for changes in amount and color  - Pierce appropriate cooling/warming therapies per order  - Administer medications as ordered  - Instruct and encourage patient and family to use good hand hygiene technique  - Identify and instruct in appropriate isolation precautions for identified infection/condition  Outcome: Progressing  Goal: Absence of fever/infection during neutropenic period  Description: INTERVENTIONS:  - Monitor WBC    Outcome: Progressing     Problem: SAFETY ADULT  Goal: Patient will remain free of falls  Description: INTERVENTIONS:  - Educate patient/family on patient safety including physical limitations  - Instruct patient to call for assistance with activity   - Consult OT/PT to assist with strengthening/mobility   - Keep Call bell within reach  - Keep bed low and locked with side rails adjusted as appropriate  - Keep care items and personal belongings within reach  - Initiate and maintain comfort rounds  - Make Fall Risk Sign visible to staff  - Offer Toileting every 2 Hours, in advance of need  - Initiate/Maintain bed alarm  - Obtain necessary fall risk management equipment: non slip socks  - Apply yellow socks and bracelet for high fall risk patients  - Consider moving patient to room near nurses station  Outcome: Progressing  Goal: Maintain or return to baseline ADL function  Description: INTERVENTIONS:  - Educate patient/family on patient safety including physical  limitations  - Instruct patient to call for assistance with activity   - Consult OT/PT to assist with strengthening/mobility   - Keep Call bell within reach  - Keep bed low and locked with side rails adjusted as appropriate  - Keep care items and personal belongings within reach  - Initiate and maintain comfort rounds  - Make Fall Risk Sign visible to staff  - Offer Toileting every 2 Hours, in advance of need  - Initiate/Maintain bed alarm  - Obtain necessary fall risk management equipment: non slip socks  - Apply yellow socks and bracelet for high fall risk patients  - Consider moving patient to room near nurses station  Outcome: Progressing  Goal: Maintains/Returns to pre admission functional level  Description: INTERVENTIONS:  - Perform AM-PAC 6 Click Basic Mobility/ Daily Activity assessment daily.  - Set and communicate daily mobility goal to care team and patient/family/caregiver.   - Collaborate with rehabilitation services on mobility goals if consulted  - Perform Range of Motion x times a day.  - Reposition patient every x hours.  - Dangle patient x times a day  - Stand patient x times a day  - Ambulate patient x times a day  - Out of bed to chair x times a day   - Out of bed for meals x times a day  - Out of bed for toileting  - Record patient progress and toleration of activity level   Outcome: Progressing     Problem: DISCHARGE PLANNING  Goal: Discharge to home or other facility with appropriate resources  Description: INTERVENTIONS:  - Identify barriers to discharge w/patient and caregiver  - Arrange for needed discharge resources and transportation as appropriate  - Identify discharge learning needs (meds, wound care, etc.)  - Arrange for interpretive services to assist at discharge as needed  - Refer to Case Management Department for coordinating discharge planning if the patient needs post-hospital services based on physician/advanced practitioner order or complex needs related to functional status,  cognitive ability, or social support system  Outcome: Progressing     Problem: Knowledge Deficit  Goal: Patient/family/caregiver demonstrates understanding of disease process, treatment plan, medications, and discharge instructions  Description: Complete learning assessment and assess knowledge base.  Interventions:  - Provide teaching at level of understanding  - Provide teaching via preferred learning methods  Outcome: Progressing     Problem: Nutrition/Hydration-ADULT  Goal: Nutrient/Hydration intake appropriate for improving, restoring or maintaining nutritional needs  Description: Monitor and assess patient's nutrition/hydration status for malnutrition. Collaborate with interdisciplinary team and initiate plan and interventions as ordered.  Monitor patient's weight and dietary intake as ordered or per policy. Utilize nutrition screening tool and intervene as necessary. Determine patient's food preferences and provide high-protein, high-caloric foods as appropriate.     INTERVENTIONS:  - Monitor oral intake, urinary output, labs, and treatment plans  - Assess nutrition and hydration status and recommend course of action  - Evaluate amount of meals eaten  - Assist patient with eating if necessary   - Allow adequate time for meals  - Recommend/ encourage appropriate diets, oral nutritional supplements, and vitamin/mineral supplements  - Order, calculate, and assess calorie counts as needed  - Recommend, monitor, and adjust tube feedings and TPN/PPN based on assessed needs  - Assess need for intravenous fluids  - Provide specific nutrition/hydration education as appropriate  - Include patient/family/caregiver in decisions related to nutrition  Outcome: Progressing     Problem: SUBSTANCE USE/ABUSE  Goal: Will have no detox symptoms and will verbalize plan for changing substance-related behavior  Description: INTERVENTIONS:  - Monitor physical status and assess for symptoms of withdrawal  - Administer medication as  ordered  - Provide emotional support with 1 on 1 interaction with staff  - Encourage recovery focused program/ addiction education  - Assess for verbalization of changing behaviors related to substance abuse  - Initiate consults and referrals as appropriate (Case Management, Spiritual Care, etc.)  Outcome: Progressing  Goal: By discharge, will develop insight into their chemical dependency and sustain motivation to continue in recovery  Description: INTERVENTIONS:  - Attends all daily group sessions and scheduled AA groups  - Actively practices coping skills through participation in the therapeutic community and adherence to program rules  - Reviews and completes assignments from individual treatment plan  - Assist patient development of understanding of their personal cycle of addiction and relapse triggers  Outcome: Progressing

## 2024-03-02 NOTE — PLAN OF CARE
Problem: Potential for Falls  Goal: Patient will remain free of falls  Description: INTERVENTIONS:  - Educate patient/family on patient safety including physical limitations  - Instruct patient to call for assistance with activity   - Consult OT/PT to assist with strengthening/mobility   - Keep Call bell within reach  - Keep bed low and locked with side rails adjusted as appropriate  - Keep care items and personal belongings within reach  - Initiate and maintain comfort rounds  - Make Fall Risk Sign visible to staff  - Offer Toileting every 2 Hours, in advance of need  - Initiate/Maintain bed alarm  - Obtain necessary fall risk management equipment: non slip socks  - Apply yellow socks and bracelet for high fall risk patients  - Consider moving patient to room near nurses station  Outcome: Progressing

## 2024-03-02 NOTE — NURSING NOTE
Went over discharge paperwork with the patient and family. IV removed. Patient was assisted to the car with a nursing professional.

## 2024-03-02 NOTE — DISCHARGE SUMMARY
[facility]  Discharge- Eh Chauhan 1990, 33 y.o. male MRN: [id number]  Unit/Bed#: -[facility] Encounter: [id number]  Primary Care Provider: No primary care provider on file.   Date and time admitted to hospital: 2/23/2024  1:08 PM    Fracture of right calcaneus  Assessment & Plan  See mgx above    Abnormal urinalysis  Assessment & Plan  UA showing innumerable bacteria   UC showing no growth    Sepsis without acute organ dysfunction (HCC)  Assessment & Plan  See management above    Opioid use disorder  Assessment & Plan  Comes from [facility] for where he has been undergoing opioid detox  Discussed with toxicology who was consulted and recommended:  Continue suboxone 8 mg bid to started the evening of 2/23    Discussed case with toxicology again will give 1x dose of ativan   UDS + benzos  Denies any other drug use  Patient does not want to go back to [facility].  Patient withdrawal symptoms are stable.  Patient did receive a prescription for 2 weeks of Suboxone from [facility].  Patient will be discharged on as needed Valium and recommended to follow-up with [facility] in 3 to 5 days      * Sepsis due to pneumonia (HCC)  Assessment & Plan  Pt presenting d/t worsening right LE pain and pneumonia POA    Initially presented on 1/3/24 where he was found to have a calcaneal fracture after jumping over a fence near [facility] for which he was to follow-up with Ortho however was unable to follow-up    On 2/21/2024 had further pain and was told that there was a possible infection and placed on antibiotics. Pt failed abx and was told to come to the ED for further evaluation    Pt meeting sepsis criteria d/t tachypnea and leukocytosis of 17.58 with a source being osteomyelitis  Patient was given Vanco and cefepime and 1 L of fluids in the ED  Lactic acid 2.1  ESR 69  CRP 10  blood cultures NGTD   MRSA culture is positive  MRI showing no osteo at this time  CT c/a/pelvis--  "Scattered small groundglass opacities in the right lower greater than upper lobes, suggesting atypical infection.2. Mild circumferential urinary bladder wall thickening. Correlate with urinalysis.3. Skin thickening/edema at the base of the penis/upper scrotum, suggesting cellulitis. Recommend direct inspection. Both testes located in the distal inguinal canals, suggesting cryptorchidism.4. Indeterminate 1.3 cm hypodense right hepatic lobe lesion. Outpatient MRI abdomen with and without contrast can be obtained for further evaluation.\"  Patient was seen by podiatry and and had MRI which showed healing calcaneal fracture with no evidence of osteomyelitis  Podiatry recommended cam boot and crutches and outpatient follow-up with them  C diff negative  Sputum culture growing MRSA  On Rocephin and doxycycline  Echo ejection fraction of 55% with normal wall motion.  Toradol for severe pain  Trend WBC and fever curve  Patient completed the course of Rocephin.  Patient will be discharged on doxycycline to complete the course of treatment for pneumonia  Patient is ambulating and saturating well on room air      Hospital Course:     Eh Chauhan is a 33 y.o. male patient who originally presented to the hospital on   Admission Orders (From admission, onward)       Ordered        02/23/24 1601  INPATIENT ADMISSION  Once                         due to concern for osteomyelitis of right foot.  Patient was admitted with sepsis secondary to pneumonia and was started on cefepime and vancomycin. Patient came from [facility] and was seen by podiatry and toxicology.  Patient was started on Suboxone for opioid withdrawal.  Patient was having active withdrawal symptoms and was started on as needed Valium, clonidine  Podiatry ordered MRI images which showed healing calcaneal fractures with no signs of osteomyelitis.  As per podiatry no surgical intervention is needed and outpatient follow-up with them.  Patient was recommended cam boot " and crutches.  Patient was found to have MRSA in sputum.  Patient antibiotic was switched to Rocephin and was started on doxycycline.  Patient remains afebrile and blood cultures are negative to date.  Patient is saturating well on room air.  Patient withdrawal symptoms are much better.  Patient does not want to return to [facility] and wants to go home and states he will follow-up as outpatient with [facility].  Discussed with patient and Aunt Teresa at length and patient will be discharged home and recommended outpatient follow-up with [facility]  On Exam-  Chest-bilateral air entry, clear to auscultation  Abdomen-soft, nontender  Heart-S1 S2 regular  Extremities-no pedal edema or calf tenderness  Neuro-alert awake oriented x3.  No focal deficits    Please see above list of diagnoses and related plan for additional information.   Right Foot  Maintain nonweightbearing with use of cam boot and crutches  Elevate foot is much as possible.  Follow-up with [facility] podiatry upon discharge, information was placed in discharge instructions.    Follow-up with PCP in 1 week  Follow-up with podiatry as outpatient  Follow-up with [facility] within a week    Condition at Discharge:  good      Discharge instructions/Information to patient and family:   See after visit summary for information provided to patient and family.      Provisions for Follow-Up Care:  See after visit summary for information related to follow-up care and any pertinent home health orders.      Disposition:     Home       Discharge Statement:  I spent 40 minutes discharging the patient. This time was spent on the day of discharge. I had direct contact with the patient on the day of discharge. Greater than 50% of the total time was spent examining patient, answering all patient questions, arranging and discussing plan of care with patient as well as directly providing post-discharge instructions.  Additional time then spent on discharge  activities.    Discharge Medications:  See after visit summary for reconciled discharge medications provided to patient and family.      ** Please Note: This note has been constructed using a voice recognition system **

## 2024-03-02 NOTE — ASSESSMENT & PLAN NOTE
Comes from [facility] for where he has been undergoing opioid detox  Discussed with toxicology who was consulted and recommended:  Continue suboxone 8 mg bid to started the evening of 2/23    Discussed case with toxicology again will give 1x dose of ativan   UDS + benzos  Denies any other drug use  Patient does not want to go back to [facility].  Patient withdrawal symptoms are stable.  Patient did receive a prescription for 2 weeks of Suboxone from [facility].  Patient will be discharged on as needed Valium and recommended to follow-up with [facility] in 3 to 5 days

## 2024-03-07 NOTE — UTILIZATION REVIEW
URGENT/EMERGENT  INPATIENT/SPU AUTHORIZATION REQUEST    Date: 03/07/24            # Pages in this Request:     x New Request   Additional Information for PA#:     Office Contact Name:  Nia Jacobs Title: Utilization Review, Registed Nurse     Phone: [phone]  Ext.     Availability (Date/Time): M-F 8-4    x Inpatient Review  SPU Review        Current       x Late Pick-up   How your facility was first notified of the Late Pick-up: EVS  When your facility was first notified of the Late Pick-up (date): 3/4/24         RECIPIENT INFORMATION    Recipient ID#: [id number]  Recipient Name: Eh Chauhan      YOB: 1990  33 y.o. Recipient Alias:     Gender:  X Male  Female Medicaid Eligibility (HCB Package):          INSURANCE INFORMATION    (All other private or governmental health insurance benefits must be utilized prior to billing the [place] Program)    Was this admission the result of an MVA, other accident, assault, injury, fall, gunshot, bite etc.?    Yes X No                   If yes, provide a brief description of the incident.    Does the recipient have other insurance coverage?  Yes X No        Insurance Company Name/Policy #      Did that insurance pay on this claim?  Yes  No        Did that insurance deny this claim?  Yes  No    If yes, reason for denial:      Does the recipient have Medicare?   Yes X No        Did Medicare exhaust prior to this admission?  Yes  No        Did Medicare partially pay this claim?  Yes  No        Did that insurance deny this claim?  Yes  No    If yes, reason for denial:          Was the recipient a prisoner at the time of admission?  Yes X No            PROVIDER INFORMATION    Hospital Name: [facility] Provider ID#: [id number]      Admitting Physician Name: [facility]  [facility] Provider ID#: [id number]        ADMISSION INFORMATION    Type of Admission: (please choose one)    X ED      Direct    If yes, from where?     Transfer    If yes, transferring  "hospital (inpatient, rehab, or psych) Provider Name/Provider ID#:    Admission Floor or Unit Type:MED-SURG      Dates/Times:        ED Date/Time: 2/23/2024  1138 AM        Observation Date/Time:          IP Admission Date/Time: 2/23/24  16:01 PM        Discharge or Transfer Date/Time: 3/2/2024  2:33 PM        DIAGNOSIS/PROCEDURE CODES    Primary Diagnosis Code/Primary Diagnosis Code description:  Opiate withdrawal (HCC) [F11.93]  Osteomyelitis of right foot (HCC) [M86.9]  A41.9 - Sepsis, unspecified organism  J18.9 - Pneumonia, unspecified organism  Additional Diagnosis Code(s) and Description(s)-(up to 3 additional codes):      Procedure Code (1) and description:NONE        CLINICAL INFORMATION - PRIOR ADMISSION ONLY    Is there a prior admission with a discharge date within 30 days of the date of this admission?    X No (Proceed to the next section - \"Clinical Information - General Review Checklist\")      Yes (Provide the following information)     Prior admission dates:    MA Prior Authorization Number:        Review Outcome:     Diagnosis Code(s)/Description:    Procedure Code/Description:    Findings:    Treatment:    Condition on Discharge:   Vitals:    Labs:   Imaging:   Medications:    Follow-up Instructions:    Disposition:        CLINICAL INFORMATION - GENERAL REVIEW CHECKLIST    EMERGENCY DEPARTMENT: (Proceed to \"ADMISSION\" if Direct Admission)    Presenting Signs/Symptoms:  Chief Complaint   Patient presents with    Wound Check     Pt reports fracturing his right heel and then got an infection in the bone. Started oral abx today. Came to er to \"get it checked out\". Reports he did not get it checked before being placed on abx, and the abx came from [facility] [place] rehab.    Initial Presentation: 33 y.o. male to the ED from home with complaints of right heel wound.  Admitted to inpatient for osteomyelitis right foot.  H/O HTN and opioid abuse currently detoxing at IP rehab.  Fractured heel 1/2/24, " currently in walking boot.  Seen at another facility the day prior due to increase in redness, started on abx, advised to have MRI done, but did not get it done.  It is now very painful to walk.  Arrives with tremors, shortness of breath. Erythema, edema of right foot. GCs 15, flat affect. On arrival, wbc 17.58, CRp 10, ESR 69.  Blood cultures pending, started on IV abx.  Check MRi.  Give Toradol for severe pain. He is diaphoretic. Podiatry and med/tox consult.  Med/tox: Has had dosing of buprenorphine already, nothing further to do for withdrawal symptoms. Continue buprenorphine.      Podiatry: Patient with white blood cell count of 17.58, elevated lactic acid, sed rate, CRP. Need to rule out calcaneal infection given suspected lucency noted posterior aspect of calcaneus suspicious for infection versus residual fracture. Osteopenia on CT scan yet has been weightbearing in cam boot. Continue with IV abx.  WBAT.      Date: 2/24   Day 2:   Blood cultures pending. MRi does not show osteo currently. Continue with IV abx.  No surgical intervention needed at this time. Right foot, no erythema is present, foot is no longer warm to touch, patient is diaphoretic to bilateral lower extremities, there is no pain on palpation of calcaneus, no pain on range of motion of ankle and subtalar joint. Check ECHO.      2/24 UPDate:  after starting vanco, became flushed, red, patchy rash on face, chest, shoulders.  Hold Vanco and dc future doses.  Continue with cefepime for possible UTi.  Lungs clear. No difficulty  breathing.     Medication/treatment prior to arrival in the ED:    Past Medical History:  has a past medical history of Hypertension.     Clinical Exam:    Initial Vital Signs: (Temp, Pulse, Resp, and BP)   ED Triage Vitals   Temperature Pulse Respirations Blood Pressure SpO2   02/23/24 1154 02/23/24 1154 02/23/24 1152 02/23/24 1154 02/23/24 1154   98.8 °F (37.1 °C) 86 18 139/96 97 %      Temp Source Heart Rate Source  Patient Position - Orthostatic VS BP Location FiO2 (%)   02/23/24 1152 02/23/24 1152 02/23/24 1152 02/23/24 1152 --   Temporal Monitor Sitting Right arm       Pain Score       02/23/24 2106       4           Pertinent Repeat Vital Signs: (include times they were obtained)  Date/Time Temp Pulse Resp BP MAP (mmHg) SpO2 O2 Device Patient Position - Orthostatic VS   02/24/24 2300 -- 83 20 152/79 109 96 % -- --   02/24/24 2200 -- 86 21 146/86 110 99 % -- --   02/24/24 2100 -- 89 24 Abnormal  138/77 103 99 % -- --   02/24/24 2000 -- 79 21 145/80 106 99 % -- --   02/24/24 1900 98.1 °F (36.7 °C) 85 20 139/79 102 100 % None (Room air) Lying   02/24/24 1616 97.9 °F (36.6 °C) 95 24 Abnormal  145/81 108 100 % -- --   02/24/24 1600 -- 81 24 Abnormal  183/106 Abnormal  138 100 % -- --   02/24/24 1500 -- 79 34 Abnormal  154/100 122 98 % -- --   02/24/24 1400 -- 76 42 Abnormal  138/91 111 99 % -- --   02/24/24 1300 -- 84 47 Abnormal  140/84 108 99 % -- --   02/24/24 1257 98.1 °F (36.7 °C) 86 33 Abnormal  138/90 109 99 % None (Room air) Lying   02/24/24 1130 100.7 °F (38.2 °C) Abnormal  -- -- -- -- -- -- --   02/24/24 07:55:23 97.8 °F (36.6 °C) 87 22 130/80 97 98 % -- --   02/24/24 0526 -- 95 -- -- -- -- -- --   02/23/24 23:06:18 97.2 °F (36.2 °C) Abnormal  74 -- -- -- 97 % -- --   02/23/24 21:47:32 97.2 °F (36.2 °C) Abnormal  84 -- -- -- 97 % -- --   02/23/24 21:09:27 97.9 °F (36.6 °C) 79 21 150/98 115 98 % -- --   02/23/24 20:06:45 98.8 °F (37.1 °C) 79 -- -- -- 98 % -- --   02/23/24 17:56:51 98.8 °F (37.1 °C) 79 22 139/82 101 99 % -- --   02/23/24 1615 -- 80 18 134/79 101 100 % -- --   02/23/24 1430 -- 71 18 137/74 99 99 % -- --   02/23/24 1400 -- 68 18 136/80 102 99 % -- --   02/23/24 1331 -- -- -- -- -- -- None (Room air) --   02/23/24 1330 -- 66 18 134/90 108 98 % -- --   02/23/24 1154 98.8 °F (37.1 °C) 86 -- 139/96 -- 97 % None (Room air) --   02/23/24 1152 -- -- 18 -- -- -- None (Room air) Sitting     Pertinent Sustained  Findings: (include times they were obtained)    Weight in Kilograms:     02/23/24 79.4 kg (175 lb)     Pertinent Labs (results):  Results from last 7 days   Lab Units 02/25/24 0234 02/24/24  0352 02/23/24  1322   WBC Thousand/uL 12.24* 13.14* 17.58*   HEMOGLOBIN g/dL 12.6 12.1 14.9   HEMATOCRIT % 40.9 39.3 46.7   PLATELETS Thousands/uL 368 307 593*   NEUTROS ABS Thousands/µL 7.89* 9.33* 13.56*                 Results from last 7 days   Lab Units 02/25/24 0234 02/24/24  0352 02/23/24  1322   SODIUM mmol/L 141 140 141   POTASSIUM mmol/L 3.4* 3.8 3.6   CHLORIDE mmol/L 104 108 102   CO2 mmol/L 27 23 27   ANION GAP mmol/L 10 9 12   BUN mg/dL 14 16 15   CREATININE mg/dL 0.65 0.61 0.81   EGFR ml/min/1.73sq m 129 133 118   CALCIUM mg/dL 9.0 9.1 10.4*   MAGNESIUM mg/dL 2.3 2.1  --              Results from last 7 days   Lab Units 02/25/24 0234 02/24/24  0352 02/23/24  1322   AST U/L 15 12* 14   ALT U/L 16 18 26   ALK PHOS U/L 81 83 117*   TOTAL PROTEIN g/dL 6.9 6.9 9.1*   ALBUMIN g/dL 3.8 3.7 4.8   TOTAL BILIRUBIN mg/dL 0.61 0.56 0.48   BILIRUBIN DIRECT mg/dL 0.09  --   --                  Results from last 7 days   Lab Units 02/25/24 0234 02/24/24  0352 02/23/24  1322   GLUCOSE RANDOM mg/dL 94 96 114                   Results from last 7 days   Lab Units 02/25/24 0234 02/24/24  0352   PROCALCITONIN ng/ml <0.05 <0.05            Results from last 7 days   Lab Units 02/23/24  1956 02/23/24  1336   LACTIC ACID mmol/L 0.9 2.1*              Results from last 7 days   Lab Units 02/25/24  0234   LIPASE u/L 29           Results from last 7 days   Lab Units 02/23/24  1322   CRP mg/L 10.9*   SED RATE mm/hour 69*           Results from last 7 days   Lab Units 02/23/24  1432   CLARITY UA   Clear   COLOR UA   Yellow   SPEC GRAV UA   >=1.030   PH UA   6.0   GLUCOSE UA mg/dl 30 (3/100%)*   KETONES UA mg/dl 10 (1+)*   BLOOD UA   Negative   PROTEIN UA mg/dl 30 (1+)*   NITRITE UA   Negative   BILIRUBIN UA   Negative   UROBILINOGEN UA  (BE) mg/dl <2.0   LEUKOCYTES UA   Negative   WBC UA /hpf 0-1   RBC UA /hpf 0-1   BACTERIA UA /hpf Innumerable*   EPITHELIAL CELLS WET PREP /hpf Occasional                   Results from last 7 days   Lab Units 02/23/24  1432   AMPH/METH   Negative   BARBITURATE UR   Negative   BENZODIAZEPINE UR   Positive*   COCAINE UR   Negative   METHADONE URINE   Negative   OPIATE UR   Negative   PCP UR   Negative   THC UR   Negative               Results from last 7 days   Lab Units 02/23/24  1432 02/23/24  1336   BLOOD CULTURE    --  No Growth at 24 hrs.  No Growth at 24 hrs.   URINE CULTURE   No Growth <1000 cfu/mL  --                    Results from last 7 days   Lab Units 02/25/24  0235   VANCOMYCIN RM ug/mL 8.2*       Radiology (results):  MRI ankle/heel right w wo contrast   Final Result by Antoni Vasquez MD (02/24 0005)       Expected appearance of healing calcaneal fractures. While there is no evidence of osteomyelitis, please note that the sensitivity is significantly diminished given presence of fractures. If further imaging is desired, a technetium-sulfur colloid bone    scan, along with an indium tagged white blood cell scan may be helpful in this scenario.           Workstation performed: [id number]           CT lower extremity wo contrast right   Final Result by Cristian Delgado MD (02/23 1507)       Extensive comminuted calcaneus fracture as above. Mild diffuse osteopenia likely related to disuse.           Workstation performed: [id number]           XR chest 1 view portable   ED Interpretation by Melissa Jett PA-C (02/23 1446)   No acute abnormalities.        Final Result by Torsten Ambriz MD (02/23 1626)       No acute cardiopulmonary disease.     EKG (results):     Other tests (results):    Tests pending final results:    Treatment in the ED:   Medication Administration from 02/23/2024 1137 to 02/23/2024 1752         Date/Time Order Dose Route Action       02/23/2024 1418 EST ondansetron  "(ZOFRAN) injection 4 mg 4 mg Intravenous Given                  02/23/2024 1435 EST sodium chloride 0.9 % bolus 1,000 mL 1,000 mL Intravenous New Bag       02/23/2024 1625 EST cefepime (MAXIPIME) IVPB (premix in dextrose) 2,000 mg 50 mL 2,000 mg Intravenous New Bag               Other treatments:      Change in condition while in the ED:     Response to ED Treatment:          OBSERVATION: (Proceed to \"ADMISSION\" if Direct Admission)    Orders written during the observation period  Meds Name, dose, route, time, how may doses given:  PRN Meds Name, dose, route, time, how many doses given within the first 24 hrs.:  IVs Type, rate, and total amt. ordered/given:  Labs, imaging, other:  Consults and findings:    Test Results during the observation period  Pertinent Lab tests (dates/results):  Culture results (blood, urine, spinal, wound, respiratory, etc.):  Imaging tests (dates/results):  EKG (dates/results):  Other test (dates/results):  Tests pending (dates/results):    Surgical or Invasive Procedures during the observation period  Name of surgery/procedure:  Date & Time:  Patient Response:  Post-operative orders:  Operative Report/Findings:    Response to Treatment, Major Change in Condition, Major Charge in Treatment during the observation period          ADMISSION:    DIRECT Admissions Only:    Presenting Signs/Symptoms:     Medication/treatment prior to arrival:    Past Medical History:    Clinical Exam on admission:    Vital Signs on admission: (Temp, Pulse, Resp, and BP)    Weight in kilograms:     ALL Admissions:    Admission Orders and Other Orders written within the first 24 hrs after admission    IP CONSULT TO PODIATRY  IP CONSULT TO TOXICOLOGY  IP CONSULT TO CASE MANAGEMENT    Meds Name, dose, route, time, how may doses given:  buprenorphine-naloxone, 8 mg, Sublingual, BID  cefepime, 2,000 mg, Intravenous, Q8H  melatonin, 6 mg, Oral, HS  sodium chloride, 1,000 mL, Intravenous, Once        Continuous IV " Infusions:  multi-electrolyte, 75 mL/hr, Intravenous, Continuous        PRN Meds:  aluminum-magnesium hydroxide-simethicone, 30 mL, Oral, Q6H PRN  diazepam, 5 mg, Oral, Q6H PRN  ibuprofen, 400 mg, Oral, Q6H PRN  ketorolac, 15 mg, Intravenous, Q6H PRN  ondansetron, 4 mg, Intravenous, Q4H PRN  polyethylene glycol, 17 g, Oral, Daily PRN  trimethobenzamide, 200 mg, Intramuscular, Q6H PRN      Labs, imaging, other:      Consults and findings:  Osteomyelitis of right foot (HCC)  Assessment & Plan  Pt presenting d/t worsening right LE pain and possible infection     Initially presented on 1/3/24 where he was found to have a calcaneal fracture after jumping over a fence near [facility] for which he was to follow-up with Ortho however was unable to follow-up     On 2/21/2024 had further pain and was told that there was a possible infection and placed on antibiotics. Pt failed abx and was told to come to the ED for further evaluation     Pt meeting sepsis criteria d/t tachypnea and leukocytosis of 17.58 with a source being osteomyelitis  Patient was given Vanco and cefepime and 1 L of fluids in the ED  Lactic acid 2.1  ESR 69  CRP 10  Discussed with podiatry for home was consulted and recommended continuation of antibiotics and MRI     Follow-up blood cultures  Continue Vanco and cefepime  Follow-up MRI results  Toradol for severe pain     Fracture of right calcaneus  Assessment & Plan  See mgx above     Sepsis without acute organ dysfunction (HCC)  Assessment & Plan  See management above     Abnormal urinalysis  Assessment & Plan  UA showing innumerable bacteria   UC ordered     Opioid use disorder  Assessment & Plan  Comes from [facility] for where he has been undergoing opioid detox  Discussed with toxicology who was consulted and recommended:  Continue suboxone 8 mg bid to start this evening 2/23                    VTE Pharmacologic Prophylaxis: VTE Score: 2 Low Risk (Score 0-2) - Encourage Ambulation.  Code Status:  Level 1 - Full Code dull code  Discussion with family: Patient declined call to .      Anticipated Length of Stay: Patient will be admitted on an inpatient basis with an anticipated length of stay of greater than 2 midnights secondary to sepsis 2/2 osteo.      Inpatient consult to Toxicology  Consult performed by: Jason Carpenter DO  Consult ordered by: Melissa Jett PA-C           02/23/24        ASSESSMENT:  Opioid Withdrawal  Opioid Use Disorder  Calcaneal osteomyelitis     RECOMMENDATIONS:  Given the timeframe and number of doses of buprenorphine he's already received, there is nothing further necessary from a withdrawal standpoint needed. Simply continue buprenorphine 8mg BID starting this evening.      Med tox will remain available as needed.       [facility] Podiatry - Consultation          ASSESSMENT:     hE Chauhan is a 33 y.o. male with:     Right calcaneal fracture  Leukocytosis  Opioid abuse currently detoxing, hypertension     PLAN:     Initial inpatient hospital consultation and bilateral pedal examination.  I personally viewed patient's medical records, patient's right calcaneal x-rays from 2/21/2024, right lower extremity CT without contrast from 2/23/2024, I reviewed images and radiologist interpretation for both.  Patient with white blood cell count of 17.58, elevated lactic acid, sed rate, CRP, at this time we need to rule out calcaneal infection given suspected lucency noted posterior aspect of calcaneus suspicious for infection versus residual fracture.  Patient has osteopenia on CT scan yet has been weightbearing in cam boot.    Case was extensively discussed with ED provider, MRI with and without contrast was ordered to rule out abscess, osteomyelitis versus osteopenia from fracture versus Charcot foot as suggested by orthopedic surgery.  Patient started on IV antibiotics of cefepime per ED.  At this time no surgical intervention is necessary, we will trend white blood  cell count, labs, blood cultures, MRI.  Patient may weight-bear as tolerated in cam boot which she already has.  Thank you for consultation, we will gladly follow along.  Rest of care per primary team.      Test Results after admission  Pertinent Lab tests (dates/results):    Culture results (blood, urine, spinal, wound, respiratory, etc.):  Imaging tests (dates/results):    EKG (dates/results):    Other test (dates/results):  Tests pending (dates/results):    Surgical or Invasive Procedures  Name of surgery/procedure:  Date & Time:  Patient Response:  Post-operative orders:  Operative Report/Findings:    Response to Treatment, Major Change in Condition, Major Charge in Treatment anytime during admission      Disposition on Discharge  Home, Rehab, SNF, LTC, Shelter, etc.: Home/Self Care    Cease to Breathe (CTB)  If a patient expires during an admission, in addition to the above information, please include:    Summary/timeline of the patient's decline in condition:    Medications and treatment:    Patient response to treatment:    Date and time patient ceased to breathe:        Is there a Readmission that follows this admission?   Yes x No    If yes, provide dates:          InterQual Review    InterQual Criteria Met:  Yes x No  N/A        Please include the InterQual Review, InterQual year/version used, and the criteria selected:   InterQual® Review Status: In Primary  Review Type: Admission  Criteria Status: Not Met  Day of review: Episode Day 1  Condition Specific: Yes        REVIEW DETAILS     Product: LOC:Acute Adult  Subset: Infection: Sepsis        Select Day, One:          [  ] Episode Day 1, One:              [  ] ACUTE, One:                  [  ] Systemic infection (excludes viral), All:                      [X] SIRS, >= Two:                          [X] WBC, >= One:                              [X] > 12,000/cu.mm(12x109/L) or < 4,000/cu.mm(4x109/L)                          [X] Heart rate > 90/min,  sustained                          [X] Respiratory rate > 20/min, sustained                      [X] Intervention, Both:                          [X] Anti-infective                          [X] IV fluid        Version: InterQual® 2023, Oct. 2023 Release        PLEASE SUBMIT THE COMPLETED FORM TO THE DEPARTMENT OF HUMAN SERVICES - DIVISION OF CLINICAL  REVIEW VIA FAX [phone] or VIA E-MAIL TO [email or web address]    Signature: Nia Jacobs Date:  03/07/24    Confidentiality Notice: The documents accompanying this telecopy may contain confidential information belonging to the sender.  The information is intended only for the use of the individual named above. If you are not the intended recipient, you are hereby notified  That any disclosure, copying, distribution or taking of any telecopy is strictly prohibited.